# Patient Record
Sex: MALE | Race: WHITE | Employment: OTHER | URBAN - METROPOLITAN AREA
[De-identification: names, ages, dates, MRNs, and addresses within clinical notes are randomized per-mention and may not be internally consistent; named-entity substitution may affect disease eponyms.]

---

## 2017-02-05 ENCOUNTER — APPOINTMENT (OUTPATIENT)
Dept: GENERAL RADIOLOGY | Facility: HOSPITAL | Age: 82
DRG: 247 | End: 2017-02-05
Attending: EMERGENCY MEDICINE
Payer: MEDICARE

## 2017-02-05 ENCOUNTER — HOSPITAL ENCOUNTER (INPATIENT)
Facility: HOSPITAL | Age: 82
LOS: 3 days | Discharge: HOME | DRG: 247 | End: 2017-02-09
Attending: EMERGENCY MEDICINE | Admitting: INTERNAL MEDICINE
Payer: MEDICARE

## 2017-02-05 DIAGNOSIS — N18.9 RENAL FAILURE (ARF), ACUTE ON CHRONIC (HCC): ICD-10-CM

## 2017-02-05 DIAGNOSIS — N17.9 RENAL FAILURE (ARF), ACUTE ON CHRONIC (HCC): ICD-10-CM

## 2017-02-05 DIAGNOSIS — E86.0 DEHYDRATION: ICD-10-CM

## 2017-02-05 DIAGNOSIS — R55 SYNCOPE, NEAR: Primary | ICD-10-CM

## 2017-02-05 PROBLEM — R79.89 AZOTEMIA: Status: ACTIVE | Noted: 2017-02-05

## 2017-02-05 PROBLEM — R73.9 HYPERGLYCEMIA: Status: ACTIVE | Noted: 2017-02-05

## 2017-02-05 PROBLEM — E87.2 METABOLIC ACIDOSIS: Status: ACTIVE | Noted: 2017-02-05

## 2017-02-05 LAB
ALBUMIN SERPL-MCNC: 3.5 G/DL (ref 3.5–4.8)
ALP LIVER SERPL-CCNC: 206 U/L (ref 45–117)
ALT SERPL-CCNC: 16 U/L (ref 17–63)
AST SERPL-CCNC: 14 U/L (ref 15–41)
BASOPHILS # BLD AUTO: 0.06 X10(3) UL (ref 0–0.1)
BASOPHILS NFR BLD AUTO: 0.7 %
BILIRUB SERPL-MCNC: 0.5 MG/DL (ref 0.1–2)
BILIRUB UR QL STRIP.AUTO: NEGATIVE
BUN BLD-MCNC: 46 MG/DL (ref 8–20)
CALCIUM BLD-MCNC: 9 MG/DL (ref 8.3–10.3)
CHLORIDE: 109 MMOL/L (ref 101–111)
CO2: 19 MMOL/L (ref 22–32)
CREAT BLD-MCNC: 2.11 MG/DL (ref 0.7–1.3)
EOSINOPHIL # BLD AUTO: 0.1 X10(3) UL (ref 0–0.3)
EOSINOPHIL NFR BLD AUTO: 1.1 %
ERYTHROCYTE [DISTWIDTH] IN BLOOD BY AUTOMATED COUNT: 13.1 % (ref 11.5–16)
GLUCOSE BLD-MCNC: 227 MG/DL (ref 70–99)
GLUCOSE UR STRIP.AUTO-MCNC: >=500 MG/DL
HCT VFR BLD AUTO: 38.4 % (ref 37–53)
HGB BLD-MCNC: 12.7 G/DL (ref 13–17)
IMMATURE GRANULOCYTE COUNT: 0.03 X10(3) UL (ref 0–1)
IMMATURE GRANULOCYTE RATIO %: 0.3 %
KETONES UR STRIP.AUTO-MCNC: NEGATIVE MG/DL
LYMPHOCYTES # BLD AUTO: 1.19 X10(3) UL (ref 0.9–4)
LYMPHOCYTES NFR BLD AUTO: 13.4 %
M PROTEIN MFR SERPL ELPH: 7 G/DL (ref 6.1–8.3)
MCH RBC QN AUTO: 30 PG (ref 27–33.2)
MCHC RBC AUTO-ENTMCNC: 33.1 G/DL (ref 31–37)
MCV RBC AUTO: 90.8 FL (ref 80–99)
MONOCYTES # BLD AUTO: 0.84 X10(3) UL (ref 0.1–0.6)
MONOCYTES NFR BLD AUTO: 9.4 %
NEUTROPHIL ABS PRELIM: 6.69 X10 (3) UL (ref 1.3–6.7)
NEUTROPHILS # BLD AUTO: 6.69 X10(3) UL (ref 1.3–6.7)
NEUTROPHILS NFR BLD AUTO: 75.1 %
NITRITE UR QL STRIP.AUTO: NEGATIVE
PH UR STRIP.AUTO: 5 [PH] (ref 4.5–8)
PLATELET # BLD AUTO: 260 10(3)UL (ref 150–450)
POTASSIUM SERPL-SCNC: 4.5 MMOL/L (ref 3.6–5.1)
PROT UR STRIP.AUTO-MCNC: NEGATIVE MG/DL
RBC # BLD AUTO: 4.23 X10(6)UL (ref 3.8–5.8)
RED CELL DISTRIBUTION WIDTH-SD: 42.7 FL (ref 35.1–46.3)
SODIUM SERPL-SCNC: 138 MMOL/L (ref 136–144)
SP GR UR STRIP.AUTO: 1.01 (ref 1–1.03)
TROPONIN: 0.23 NG/ML (ref ?–0.05)
TROPONIN: <0.046 NG/ML (ref ?–0.05)
UROBILINOGEN UR STRIP.AUTO-MCNC: <2 MG/DL
WBC # BLD AUTO: 8.9 X10(3) UL (ref 4–13)
WBC #/AREA URNS AUTO: >50 /HPF

## 2017-02-05 PROCEDURE — 84484 ASSAY OF TROPONIN QUANT: CPT | Performed by: HOSPITALIST

## 2017-02-05 PROCEDURE — 93010 ELECTROCARDIOGRAM REPORT: CPT | Performed by: INTERNAL MEDICINE

## 2017-02-05 PROCEDURE — 93010 ELECTROCARDIOGRAM REPORT: CPT

## 2017-02-05 PROCEDURE — 81001 URINALYSIS AUTO W/SCOPE: CPT | Performed by: EMERGENCY MEDICINE

## 2017-02-05 PROCEDURE — 80053 COMPREHEN METABOLIC PANEL: CPT

## 2017-02-05 PROCEDURE — 87186 SC STD MICRODIL/AGAR DIL: CPT | Performed by: EMERGENCY MEDICINE

## 2017-02-05 PROCEDURE — 93005 ELECTROCARDIOGRAM TRACING: CPT

## 2017-02-05 PROCEDURE — 87086 URINE CULTURE/COLONY COUNT: CPT | Performed by: EMERGENCY MEDICINE

## 2017-02-05 PROCEDURE — 85025 COMPLETE CBC W/AUTO DIFF WBC: CPT

## 2017-02-05 PROCEDURE — 84484 ASSAY OF TROPONIN QUANT: CPT | Performed by: EMERGENCY MEDICINE

## 2017-02-05 PROCEDURE — 87088 URINE BACTERIA CULTURE: CPT | Performed by: EMERGENCY MEDICINE

## 2017-02-05 PROCEDURE — 99285 EMERGENCY DEPT VISIT HI MDM: CPT

## 2017-02-05 PROCEDURE — 71010 XR CHEST AP PORTABLE  (CPT=71010): CPT

## 2017-02-05 PROCEDURE — 96360 HYDRATION IV INFUSION INIT: CPT

## 2017-02-05 RX ORDER — LEVOTHYROXINE SODIUM 88 UG/1
88 TABLET ORAL
Status: DISCONTINUED | OUTPATIENT
Start: 2017-02-06 | End: 2017-02-09

## 2017-02-05 RX ORDER — HEPARIN SODIUM 5000 [USP'U]/ML
5000 INJECTION, SOLUTION INTRAVENOUS; SUBCUTANEOUS EVERY 8 HOURS SCHEDULED
Status: DISCONTINUED | OUTPATIENT
Start: 2017-02-05 | End: 2017-02-05

## 2017-02-05 RX ORDER — BISACODYL 10 MG
10 SUPPOSITORY, RECTAL RECTAL
Status: DISCONTINUED | OUTPATIENT
Start: 2017-02-05 | End: 2017-02-09

## 2017-02-05 RX ORDER — LATANOPROST 50 UG/ML
1 SOLUTION/ DROPS OPHTHALMIC NIGHTLY
Status: DISCONTINUED | OUTPATIENT
Start: 2017-02-05 | End: 2017-02-09

## 2017-02-05 RX ORDER — SODIUM CHLORIDE 9 MG/ML
INJECTION, SOLUTION INTRAVENOUS CONTINUOUS
Status: DISCONTINUED | OUTPATIENT
Start: 2017-02-05 | End: 2017-02-07

## 2017-02-05 RX ORDER — CHOLESTYRAMINE LIGHT 4 G/5.7G
4 POWDER, FOR SUSPENSION ORAL DAILY
Status: DISCONTINUED | OUTPATIENT
Start: 2017-02-05 | End: 2017-02-09

## 2017-02-05 RX ORDER — ONDANSETRON 2 MG/ML
4 INJECTION INTRAMUSCULAR; INTRAVENOUS EVERY 6 HOURS PRN
Status: DISCONTINUED | OUTPATIENT
Start: 2017-02-05 | End: 2017-02-09

## 2017-02-05 RX ORDER — ACETAMINOPHEN 325 MG/1
650 TABLET ORAL EVERY 6 HOURS PRN
Status: DISCONTINUED | OUTPATIENT
Start: 2017-02-05 | End: 2017-02-09

## 2017-02-05 RX ORDER — POLYETHYLENE GLYCOL 3350 17 G/17G
17 POWDER, FOR SOLUTION ORAL DAILY PRN
Status: DISCONTINUED | OUTPATIENT
Start: 2017-02-05 | End: 2017-02-09

## 2017-02-05 RX ORDER — SODIUM CHLORIDE 9 MG/ML
1000 INJECTION, SOLUTION INTRAVENOUS CONTINUOUS
Status: ACTIVE | OUTPATIENT
Start: 2017-02-05 | End: 2017-02-05

## 2017-02-05 RX ORDER — HEPARIN SODIUM 5000 [USP'U]/ML
5000 INJECTION, SOLUTION INTRAVENOUS; SUBCUTANEOUS EVERY 8 HOURS
Status: DISCONTINUED | OUTPATIENT
Start: 2017-02-05 | End: 2017-02-09

## 2017-02-05 NOTE — ED INITIAL ASSESSMENT (HPI)
Pt rpt feeling dizzy this morning when getting ready for Religious. Denies NV. Pt c/o some neck discomfort. Pt daughter rpt Pt has hx. Urosepsis & A-Fib.

## 2017-02-05 NOTE — H&P
[facility] Hospitalist H&P       CC: near syncope    PCP: Anu Mcclelland MD    History of Present Illness:     Pt is an [de-identified] with HL, DMII, BPH, who is admitted for near syncope.  Pt states that he went to [employer] this AM and upon arrival home, he was sitti Cholecalciferol (VITAMIN D) 1000 UNITS Oral Cap Take  by mouth.  Disp:  Rfl:          Soc Hx     Smoking status: Former Smoker     Smokeless tobacco: Never Used    Comment: stoped 1940's    Alcohol Use: No        Fam Hx  Family History   Problem Relation Recent Labs   Lab  02/05/17   1325   TROP  <0.046       Additional Diagnostics: ECG: NSR HR 61  CXR: image personally reviewed agree with radiologist read    Radiology: Xr Chest Ap Portable  (cpt=71010)    2/5/2017  PROCEDURE:  XR CHEST AP PORTABLE (

## 2017-02-05 NOTE — ED NOTES
Pt able to go to bathroom and provide urine sample w/o difficulty or incident, sample immediately sent to lab

## 2017-02-05 NOTE — PROGRESS NOTES
Brief Internal Medicine Note    Full Note to Follow      Pt is an [de-identified] with HL, DMII, BPH, who is admitted for near syncope. Pt states that he went to [employer] this AM and upon arrival home, he was sitting in bed and felt lightheaded for approx 30 minutes.

## 2017-02-05 NOTE — ED PROVIDER NOTES
Patient Seen in: [facility] Emergency Department    History   Patient presents with:  Dizziness (neurologic)  Hypotension (cardiovascular)    Stated Complaint: dizzy    HPI    Patient is an 56-year-old male comes into emergency room for evaluation o BY MOUTH ONCE DAILY   Cholestyramine 4 GM/DOSE Oral Powder,  1 LEVEL SCOOPFUL TWICE A DAY IN A GLASS OF WATER OR BEVERAGE OF YOUR CHOICE   latanoprost (XALATAN) 0.005 % Ophthalmic Solution,     OneTouch UltraSoft Lancets Does not apply Misc,  CHECK BLOOD G nondistended, no pulsatile masses. nontender    MUSCULOSKELETAL: No calf tenderness. Dorsalis and Posterior Tibial pulses present. No clubbing. No cyanosis. No edema. SKIN EXAMINATIoN: Warm and dry with normal appearance. No rashes or lesions.   Ole Sjogren lungs are otherwise clear. The cardiomediastinal silhouette and pulmonary vasculature are within normal limits. No significant osseous findings. 2/5/2017  CONCLUSION:  1. Mild bibasilar atelectasis versus scarring. 2. No other significant findings.

## 2017-02-05 NOTE — ED NOTES
Checked on Pt.  Pt rpt feeling improvement w/ lying still in cart, however, still has some feeling of lightheadedness

## 2017-02-06 ENCOUNTER — APPOINTMENT (OUTPATIENT)
Dept: CV DIAGNOSTICS | Facility: HOSPITAL | Age: 82
DRG: 247 | End: 2017-02-06
Attending: HOSPITALIST
Payer: MEDICARE

## 2017-02-06 LAB
ATRIAL RATE: 58 BPM
ATRIAL RATE: 71 BPM
BUN BLD-MCNC: 42 MG/DL (ref 8–20)
CALCIUM BLD-MCNC: 8.2 MG/DL (ref 8.3–10.3)
CHLORIDE: 115 MMOL/L (ref 101–111)
CO2: 24 MMOL/L (ref 22–32)
CREAT BLD-MCNC: 1.67 MG/DL (ref 0.7–1.3)
ERYTHROCYTE [DISTWIDTH] IN BLOOD BY AUTOMATED COUNT: 13.1 % (ref 11.5–16)
GLUCOSE BLD-MCNC: 121 MG/DL (ref 70–99)
GLUCOSE BLD-MCNC: 166 MG/DL (ref 65–99)
GLUCOSE BLD-MCNC: 166 MG/DL (ref 65–99)
GLUCOSE BLD-MCNC: 173 MG/DL (ref 65–99)
HAV IGM SER QL: 1.9 MG/DL (ref 1.7–3)
HCT VFR BLD AUTO: 36.4 % (ref 37–53)
HGB BLD-MCNC: 11.8 G/DL (ref 13–17)
MCH RBC QN AUTO: 29.9 PG (ref 27–33.2)
MCHC RBC AUTO-ENTMCNC: 32.4 G/DL (ref 31–37)
MCV RBC AUTO: 92.2 FL (ref 80–99)
P AXIS: 89 DEGREES
P AXIS: 91 DEGREES
P-R INTERVAL: 202 MS
P-R INTERVAL: 206 MS
PLATELET # BLD AUTO: 236 10(3)UL (ref 150–450)
POTASSIUM SERPL-SCNC: 5.2 MMOL/L (ref 3.6–5.1)
Q-T INTERVAL: 386 MS
Q-T INTERVAL: 420 MS
QRS DURATION: 84 MS
QRS DURATION: 84 MS
QTC CALCULATION (BEZET): 412 MS
QTC CALCULATION (BEZET): 419 MS
R AXIS: 16 DEGREES
R AXIS: 35 DEGREES
RBC # BLD AUTO: 3.95 X10(6)UL (ref 3.8–5.8)
RED CELL DISTRIBUTION WIDTH-SD: 44.8 FL (ref 35.1–46.3)
SODIUM SERPL-SCNC: 145 MMOL/L (ref 136–144)
T AXIS: 30 DEGREES
T AXIS: 67 DEGREES
TROPONIN: 0.24 NG/ML (ref ?–0.05)
VENTRICULAR RATE: 58 BPM
VENTRICULAR RATE: 71 BPM
WBC # BLD AUTO: 8.3 X10(3) UL (ref 4–13)

## 2017-02-06 PROCEDURE — 80048 BASIC METABOLIC PNL TOTAL CA: CPT | Performed by: HOSPITALIST

## 2017-02-06 PROCEDURE — 83735 ASSAY OF MAGNESIUM: CPT | Performed by: HOSPITALIST

## 2017-02-06 PROCEDURE — 97162 PT EVAL MOD COMPLEX 30 MIN: CPT

## 2017-02-06 PROCEDURE — 93005 ELECTROCARDIOGRAM TRACING: CPT

## 2017-02-06 PROCEDURE — 93306 TTE W/DOPPLER COMPLETE: CPT | Performed by: INTERNAL MEDICINE

## 2017-02-06 PROCEDURE — 82962 GLUCOSE BLOOD TEST: CPT

## 2017-02-06 PROCEDURE — 85027 COMPLETE CBC AUTOMATED: CPT | Performed by: HOSPITALIST

## 2017-02-06 PROCEDURE — 93306 TTE W/DOPPLER COMPLETE: CPT

## 2017-02-06 PROCEDURE — 97116 GAIT TRAINING THERAPY: CPT

## 2017-02-06 PROCEDURE — 93010 ELECTROCARDIOGRAM REPORT: CPT | Performed by: INTERNAL MEDICINE

## 2017-02-06 PROCEDURE — 96372 THER/PROPH/DIAG INJ SC/IM: CPT

## 2017-02-06 PROCEDURE — 84484 ASSAY OF TROPONIN QUANT: CPT | Performed by: INTERNAL MEDICINE

## 2017-02-06 RX ORDER — SODIUM POLYSTYRENE SULFONATE 15 G/60ML
15 SUSPENSION ORAL; RECTAL ONCE
Status: COMPLETED | OUTPATIENT
Start: 2017-02-06 | End: 2017-02-06

## 2017-02-06 RX ORDER — METOPROLOL SUCCINATE 25 MG/1
25 TABLET, EXTENDED RELEASE ORAL
Status: DISCONTINUED | OUTPATIENT
Start: 2017-02-06 | End: 2017-02-09

## 2017-02-06 RX ORDER — DILTIAZEM HYDROCHLORIDE 5 MG/ML
10 INJECTION INTRAVENOUS EVERY 2 HOUR PRN
Status: DISCONTINUED | OUTPATIENT
Start: 2017-02-06 | End: 2017-02-09

## 2017-02-06 RX ORDER — ASPIRIN 81 MG/1
81 TABLET ORAL DAILY
Status: DISCONTINUED | OUTPATIENT
Start: 2017-02-06 | End: 2017-02-09

## 2017-02-06 NOTE — PLAN OF CARE
CARDIOVASCULAR - ADULT    • Maintains optimal cardiac output and hemodynamic stability Progressing    • Absence of cardiac arrhythmias or at baseline Progressing          Patient is alert and oriented x4, VS stable, RA, , 2 episodes of Afib with RVR thi

## 2017-02-06 NOTE — CONSULTS
[facility] Electrophysiology Consult      Ania Arevalo is a [de-identified]year old male    [facility] he had and episode of lightheadedness while sitting which lasted 30 minutes. Came to the ER.     Here today this am had a 20 minute episode of atrial flutter with rates to 145bpm  · Echo: 2/2017: EF 55, mod LAE  · Nuc stress: 2/2017: pending  · LHC: 2008: moderate coronary artery disease   · Tsh: pending    Impression:  paroxysmal atrial fibrillation and atrial flutter     Plan:  Discussed options including rate control, ant

## 2017-02-06 NOTE — PROGRESS NOTES
[facility] Hospitalist Progress Note     PCP: Epi Lopez MD    CC:  Follow up    SUBJECTIVE:  This AM, pt walking halls with PT and said he felt LH. Per conversation with RN, pt went from NSR to afib with RVR for a few minutes, then back to NSR.   Pt was succinate  25 mg Oral Daily Beta Blocker   • Levothyroxine Sodium  88 mcg Oral Before breakfast   • latanoprost  1 drop Right Eye Nightly   • cholestyramine light  4 g Oral Daily   • cholecalciferol  1,000 Units Oral Daily   • Heparin Sodium (Porcine)  5,0

## 2017-02-06 NOTE — PLAN OF CARE
During ambulation with PT patient converted from NSR to Afib with RVR. Patient in Afib for few minutes than back to NSR. After ambulation patient back in Afib with RVR and hypotention with symptoms including dizziness.  Patient placed in the bed in supine p

## 2017-02-06 NOTE — PHYSICAL THERAPY NOTE
PHYSICAL THERAPY EVALUATION - INPATIENT     Room Number: 4878/7393-R  Evaluation Date: 2/6/2017  Type of Evaluation: Initial  Physician Order: PT Eval and Treat    Presenting Problem: syncope  Reason for Therapy: Mobility Dysfunction and Discharge Plan PAIN ASSESSMENT  Ratin  Location: upper traps, cerv  Management Techniques:  Activity promotion    COGNITION  · Overall Cognitive Status:  WFL - within functional limits  · Orientation Level:  oriented x4  · Following Commands:  follows multistep co AM-PAC Score:  Raw Score: 21   PT Approx Degree of Impairment Score: 28.97%   Standardized Score (AM-PAC Scale): 50.25   CMS Modifier (G-Code): CJ    FUNCTIONAL ABILITY STATUS  Gait Assessment   Gait Assistance: Supervision  Distance (ft): 150  Assisti to address the above deficits to assist patient in returning to prior level of function. DISCHARGE RECOMMENDATIONS  PT Discharge Recommendations: Home;Outpatient PT    PLAN  PT Treatment Plan: Balance training;Gait training;Patient education; Energy cons

## 2017-02-06 NOTE — CONSULTS
[facility] Cardiology  Report of Consultation    Mere Sidhu Patient Status:  Observation    1935 MRN [id number]   [facility] 3NE-A Attending Noe Costello, *   Hosp Day # 1 PCP Jacinda Clarke MD     Reason fo Levothyroxine Sodium  88 mcg Oral Before breakfast   • latanoprost  1 drop Right Eye Nightly   • cholestyramine light  4 g Oral Daily   • cholecalciferol  1,000 Units Oral Daily   • Heparin Sodium (Porcine)  5,000 Units Subcutaneous Q8H       Continuous In 02/06/17  0854   BP: 143/62   Pulse: 63   Temp: 98.3 °F (36.8 °C)   Resp: 16     Wt Readings from Last 3 Encounters:  02/05/17 : 196 lb (88.905 kg)  09/19/16 : 196 lb (88.905 kg)  05/05/16 : 198 lb (89.812 kg)          General: Well developed, well nourish TROP  <0.046  0.233*  0.237*       Diagnostics:   Tele:  SR. PSVT noted. EKG: SB  Echo:  Pending    Assessment:  1. Near syncope. Suspect multifactorial:   -Arrhythmia. Episodes of PSVT noted and reproduce Sx   -Dehydration   -Medication effect  2.

## 2017-02-07 ENCOUNTER — APPOINTMENT (OUTPATIENT)
Dept: CV DIAGNOSTICS | Facility: HOSPITAL | Age: 82
DRG: 247 | End: 2017-02-07
Attending: INTERNAL MEDICINE
Payer: MEDICARE

## 2017-02-07 LAB
ATRIAL RATE: 61 BPM
FREE T4: 1.2 NG/DL (ref 0.9–1.8)
GLUCOSE BLD-MCNC: 125 MG/DL (ref 65–99)
GLUCOSE BLD-MCNC: 140 MG/DL (ref 65–99)
GLUCOSE BLD-MCNC: 192 MG/DL (ref 65–99)
GLUCOSE BLD-MCNC: 224 MG/DL (ref 65–99)
P AXIS: -11 DEGREES
P-R INTERVAL: 206 MS
Q-T INTERVAL: 408 MS
QRS DURATION: 82 MS
QTC CALCULATION (BEZET): 410 MS
R AXIS: 10 DEGREES
T AXIS: 33 DEGREES
TSI SER-ACNC: 3.74 MIU/ML (ref 0.35–5.5)
VENTRICULAR RATE: 61 BPM

## 2017-02-07 PROCEDURE — 78452 HT MUSCLE IMAGE SPECT MULT: CPT

## 2017-02-07 PROCEDURE — 93017 CV STRESS TEST TRACING ONLY: CPT

## 2017-02-07 PROCEDURE — 84443 ASSAY THYROID STIM HORMONE: CPT | Performed by: INTERNAL MEDICINE

## 2017-02-07 PROCEDURE — 93018 CV STRESS TEST I&R ONLY: CPT | Performed by: INTERNAL MEDICINE

## 2017-02-07 PROCEDURE — 93005 ELECTROCARDIOGRAM TRACING: CPT

## 2017-02-07 PROCEDURE — 82962 GLUCOSE BLOOD TEST: CPT

## 2017-02-07 PROCEDURE — 84439 ASSAY OF FREE THYROXINE: CPT | Performed by: INTERNAL MEDICINE

## 2017-02-07 PROCEDURE — 93010 ELECTROCARDIOGRAM REPORT: CPT | Performed by: INTERNAL MEDICINE

## 2017-02-07 RX ORDER — SODIUM CHLORIDE 9 MG/ML
INJECTION, SOLUTION INTRAVENOUS CONTINUOUS
Status: DISCONTINUED | OUTPATIENT
Start: 2017-02-07 | End: 2017-02-09

## 2017-02-07 RX ORDER — ASPIRIN 81 MG/1
324 TABLET, CHEWABLE ORAL ONCE
Status: COMPLETED | OUTPATIENT
Start: 2017-02-08 | End: 2017-02-08

## 2017-02-07 NOTE — PROGRESS NOTES
[facility] Hospitalist Progress Note     PCP: Eloisa Donovan MD    CC:  Follow up    SUBJECTIVE:  Patient seen after stress today, states he feels well.  No CP/SOB    OBJECTIVE:  Temp:  [98.1 °F (36.7 °C)-98.9 °F (37.2 °C)] 98.6 °F (37 °C)  Pulse:  [] 5 Right Eye Nightly   • cholestyramine light  4 g Oral Daily   • cholecalciferol  1,000 Units Oral Daily   • Heparin Sodium (Porcine)  5,000 Units Subcutaneous Q8H     • sodium chloride 75 mL/hr at 02/06/17 2205     DiltiaZEM HCl, acetaminophen, PEG 3350, ma

## 2017-02-07 NOTE — PLAN OF CARE
PT A/O, DENIES DIZZINESS, 98% ON RA, SR/SB ON TELE, HR 50-60'S, LE EDEMA, SCDS ON, IVF INFUSING, LABS LEXISCAN TODAY, INSTRUCTED PT ON POC.   CARDIOVASCULAR - ADULT    • Maintains optimal cardiac output and hemodynamic stability Progressing    • Absence of

## 2017-02-07 NOTE — PLAN OF CARE
A/Ox4. Calm and cooperative. On RA with  97%. Tele- SB 54. Waiting for Everlena Rides to be completed. Accucheck ACHS. Family at bedside. IVF infusing. Denies any CP, SOB, or dizziness. Staff will cont to monitor.     CARDIOVASCULAR - ADULT    • Maintains opt

## 2017-02-07 NOTE — PROGRESS NOTES
[facility] [facility] Cardiology  Progress Note    Lovetta Fast Patient Status:  Inpatient    1935 MRN [id number]   [facility] 3NE-A Attending Uriel Avalos MD   Hosp Day # 2 PCP Gabby Rodriguez MD     Subjective:   No chest pain Cardiovascular:  Cardiovascular examination demonstrates a regular bradycardic  rate and rhythm. There is normal S1, S2. There is no S3 or S4. There are no murmurs, rubs, or gallops. No click is appreciated.   PMI is nondisplaced with a normal apical Right ventricle: The cavity size was normal. Systolic function was     normal.  3. Aorta: The aorta was dilated within the upper limits of normal. Aortic     root dimension: 4.0cm (ED, M-mode).   4. Pulmonary arteries: Systolic pressure could not be accurat

## 2017-02-07 NOTE — PROGRESS NOTES
Patient completed the Lexiscan Nuc Stress test without ekg changes or cardiac symptoms. Nuc scan pending.

## 2017-02-07 NOTE — CERTIFICATION
**Certification    PHYSICIAN Certification of Need for Inpatient Hospitalization    Based on the his current state of illness, Mike Mccrary requires inpatient hospitalization for his syncope.   This requires inpatient medical treatment because the patient

## 2017-02-08 ENCOUNTER — APPOINTMENT (OUTPATIENT)
Dept: INTERVENTIONAL RADIOLOGY/VASCULAR | Facility: HOSPITAL | Age: 82
DRG: 247 | End: 2017-02-08
Attending: INTERNAL MEDICINE
Payer: MEDICARE

## 2017-02-08 LAB
APTT PPP: 32.4 SECONDS (ref 25–34)
BASOPHILS # BLD AUTO: 0.07 X10(3) UL (ref 0–0.1)
BASOPHILS NFR BLD AUTO: 1.1 %
BUN BLD-MCNC: 31 MG/DL (ref 8–20)
CALCIUM BLD-MCNC: 8.3 MG/DL (ref 8.3–10.3)
CHLORIDE: 113 MMOL/L (ref 101–111)
CO2: 22 MMOL/L (ref 22–32)
CREAT BLD-MCNC: 1.63 MG/DL (ref 0.7–1.3)
EOSINOPHIL # BLD AUTO: 0.33 X10(3) UL (ref 0–0.3)
EOSINOPHIL NFR BLD AUTO: 5.3 %
ERYTHROCYTE [DISTWIDTH] IN BLOOD BY AUTOMATED COUNT: 12.9 % (ref 11.5–16)
GLUCOSE BLD-MCNC: 145 MG/DL (ref 65–99)
GLUCOSE BLD-MCNC: 145 MG/DL (ref 70–99)
GLUCOSE BLD-MCNC: 146 MG/DL (ref 65–99)
GLUCOSE BLD-MCNC: 152 MG/DL (ref 65–99)
GLUCOSE BLD-MCNC: 256 MG/DL (ref 65–99)
GLUCOSE BLD-MCNC: 331 MG/DL (ref 65–99)
HCT VFR BLD AUTO: 36.1 % (ref 37–53)
HGB BLD-MCNC: 11.7 G/DL (ref 13–17)
IMMATURE GRANULOCYTE COUNT: 0.02 X10(3) UL (ref 0–1)
IMMATURE GRANULOCYTE RATIO %: 0.3 %
INR BLD: 1.02 (ref 0.89–1.12)
LYMPHOCYTES # BLD AUTO: 1.93 X10(3) UL (ref 0.9–4)
LYMPHOCYTES NFR BLD AUTO: 30.7 %
MCH RBC QN AUTO: 29.8 PG (ref 27–33.2)
MCHC RBC AUTO-ENTMCNC: 32.4 G/DL (ref 31–37)
MCV RBC AUTO: 92.1 FL (ref 80–99)
MONOCYTES # BLD AUTO: 0.64 X10(3) UL (ref 0.1–0.6)
MONOCYTES NFR BLD AUTO: 10.2 %
NEUTROPHIL ABS PRELIM: 3.29 X10 (3) UL (ref 1.3–6.7)
NEUTROPHILS # BLD AUTO: 3.29 X10(3) UL (ref 1.3–6.7)
NEUTROPHILS NFR BLD AUTO: 52.4 %
PLATELET # BLD AUTO: 232 10(3)UL (ref 150–450)
POTASSIUM SERPL-SCNC: 4.6 MMOL/L (ref 3.6–5.1)
PSA SERPL DL<=0.01 NG/ML-MCNC: 13.7 SECONDS (ref 12.3–14.8)
RBC # BLD AUTO: 3.92 X10(6)UL (ref 3.8–5.8)
RED CELL DISTRIBUTION WIDTH-SD: 43.4 FL (ref 35.1–46.3)
SODIUM SERPL-SCNC: 142 MMOL/L (ref 136–144)
WBC # BLD AUTO: 6.3 X10(3) UL (ref 4–13)

## 2017-02-08 PROCEDURE — 85610 PROTHROMBIN TIME: CPT | Performed by: INTERNAL MEDICINE

## 2017-02-08 PROCEDURE — 82962 GLUCOSE BLOOD TEST: CPT

## 2017-02-08 PROCEDURE — B215YZZ FLUOROSCOPY OF LEFT HEART USING OTHER CONTRAST: ICD-10-PCS | Performed by: INTERNAL MEDICINE

## 2017-02-08 PROCEDURE — 027035Z DILATION OF CORONARY ARTERY, ONE ARTERY WITH TWO DRUG-ELUTING INTRALUMINAL DEVICES, PERCUTANEOUS APPROACH: ICD-10-PCS | Performed by: INTERNAL MEDICINE

## 2017-02-08 PROCEDURE — 93454 CORONARY ARTERY ANGIO S&I: CPT

## 2017-02-08 PROCEDURE — 4A023N7 MEASUREMENT OF CARDIAC SAMPLING AND PRESSURE, LEFT HEART, PERCUTANEOUS APPROACH: ICD-10-PCS | Performed by: INTERNAL MEDICINE

## 2017-02-08 PROCEDURE — 85730 THROMBOPLASTIN TIME PARTIAL: CPT | Performed by: INTERNAL MEDICINE

## 2017-02-08 PROCEDURE — 93010 ELECTROCARDIOGRAM REPORT: CPT | Performed by: INTERNAL MEDICINE

## 2017-02-08 PROCEDURE — 93005 ELECTROCARDIOGRAM TRACING: CPT

## 2017-02-08 PROCEDURE — 85025 COMPLETE CBC W/AUTO DIFF WBC: CPT | Performed by: INTERNAL MEDICINE

## 2017-02-08 PROCEDURE — 83036 HEMOGLOBIN GLYCOSYLATED A1C: CPT | Performed by: HOSPITALIST

## 2017-02-08 PROCEDURE — 99152 MOD SED SAME PHYS/QHP 5/>YRS: CPT

## 2017-02-08 PROCEDURE — 80048 BASIC METABOLIC PNL TOTAL CA: CPT | Performed by: INTERNAL MEDICINE

## 2017-02-08 PROCEDURE — B211YZZ FLUOROSCOPY OF MULTIPLE CORONARY ARTERIES USING OTHER CONTRAST: ICD-10-PCS | Performed by: INTERNAL MEDICINE

## 2017-02-08 PROCEDURE — 99153 MOD SED SAME PHYS/QHP EA: CPT

## 2017-02-08 RX ORDER — NITROGLYCERIN 0.4 MG/1
TABLET SUBLINGUAL
Status: DISPENSED
Start: 2017-02-08 | End: 2017-02-09

## 2017-02-08 RX ORDER — MAGNESIUM HYDROXIDE/ALUMINUM HYDROXICE/SIMETHICONE 120; 1200; 1200 MG/30ML; MG/30ML; MG/30ML
30 SUSPENSION ORAL 4 TIMES DAILY PRN
Status: DISCONTINUED | OUTPATIENT
Start: 2017-02-08 | End: 2017-02-09

## 2017-02-08 RX ORDER — SODIUM CHLORIDE 9 MG/ML
INJECTION, SOLUTION INTRAVENOUS CONTINUOUS
Status: ACTIVE | OUTPATIENT
Start: 2017-02-08 | End: 2017-02-09

## 2017-02-08 RX ORDER — CLOPIDOGREL BISULFATE 75 MG/1
75 TABLET ORAL DAILY
Status: DISCONTINUED | OUTPATIENT
Start: 2017-02-09 | End: 2017-02-09

## 2017-02-08 RX ORDER — HYDRALAZINE HYDROCHLORIDE 20 MG/ML
INJECTION INTRAMUSCULAR; INTRAVENOUS
Status: COMPLETED
Start: 2017-02-08 | End: 2017-02-08

## 2017-02-08 RX ORDER — CLOPIDOGREL BISULFATE 75 MG/1
TABLET ORAL
Status: COMPLETED
Start: 2017-02-08 | End: 2017-02-08

## 2017-02-08 RX ORDER — HEPARIN SODIUM 5000 [USP'U]/ML
INJECTION, SOLUTION INTRAVENOUS; SUBCUTANEOUS
Status: COMPLETED
Start: 2017-02-08 | End: 2017-02-08

## 2017-02-08 RX ORDER — NITROGLYCERIN 0.4 MG/1
0.4 TABLET SUBLINGUAL ONCE
Status: COMPLETED | OUTPATIENT
Start: 2017-02-08 | End: 2017-02-08

## 2017-02-08 RX ORDER — LIDOCAINE HYDROCHLORIDE 10 MG/ML
INJECTION, SOLUTION INFILTRATION; PERINEURAL
Status: COMPLETED
Start: 2017-02-08 | End: 2017-02-08

## 2017-02-08 RX ORDER — MIDAZOLAM HYDROCHLORIDE 1 MG/ML
INJECTION INTRAMUSCULAR; INTRAVENOUS
Status: COMPLETED
Start: 2017-02-08 | End: 2017-02-08

## 2017-02-08 RX ORDER — DEXTROSE MONOHYDRATE 25 G/50ML
50 INJECTION, SOLUTION INTRAVENOUS
Status: DISCONTINUED | OUTPATIENT
Start: 2017-02-08 | End: 2017-02-09

## 2017-02-08 RX ORDER — NOREPINEPHRINE BITARTRATE 1 MG/ML
INJECTION, SOLUTION INTRAVENOUS
Status: DISCONTINUED
Start: 2017-02-08 | End: 2017-02-08 | Stop reason: WASHOUT

## 2017-02-08 NOTE — PROGRESS NOTES
[facility] Cardiology  Progress Note    Duane Mitchell Patient Status:  Inpatient    1935 MRN [id number]   [facility] 3NE-A Attending Mireille Suggs MD   Hosp Day # 3 PCP Johnnie Goodwin MD     Subjective:   Feels ok.   Pa Cardiovascular examination demonstrates a regular bradycardic  rate and rhythm. There is normal S1, S2. There is no S3 or S4. There are no murmurs, rubs, or gallops. No click is appreciated. PMI is nondisplaced with a normal apical impulse.     Pulmona evidence for regional wall motion abnormalities.     Doppler parameters are consistent with abnormal left ventricular     relaxation - grade 1 diastolic dysfunction. 2. Right ventricle:  The cavity size was normal. Systolic function was     normal.  3. Aor

## 2017-02-08 NOTE — CM/SW NOTE
02/08/17 1000   CM/SW Referral Data   Referral Source Social Work (self-referral)   Reason for Referral Discharge planning   Informant Patient   Pertinent Medical Hx   Primary Care Physician Name Corina Espinosa   Patient Info   Patient's Mental Status Alert;Alison

## 2017-02-08 NOTE — PLAN OF CARE
Complaint of chest pain and chest tightness noted, ECG ordered, will notify cardiology for further orders. Patient remains closely monitored.

## 2017-02-08 NOTE — PROGRESS NOTES
[facility] Hospitalist Progress Note     PCP: Jim Youngblood MD    CC:  Follow up    SUBJECTIVE:  Patient seen this AM, states he feels well. No CP/SOB.  Plan for cath today    OBJECTIVE:  Temp:  [97.6 °F (36.4 °C)-99.1 °F (37.3 °C)] 97.7 °F (36.5 °C)  Pulse: aspirin  324 mg Oral Once   • aspirin EC  81 mg Oral Daily   • metoprolol succinate  25 mg Oral Daily Beta Blocker   • Levothyroxine Sodium  88 mcg Oral Before breakfast   • latanoprost  1 drop Right Eye Nightly   • cholestyramine light  4 g Oral Daily   •

## 2017-02-08 NOTE — PROGRESS NOTES
Paged Dr. Riley Tariq. Orders already to hold coumadin and lovenox for angiogram in am.  Heparin ordered, just need authorization to hold as well. 2010    Paging Dr. Nadeen Hussein in regards to HR. Metoprolol?   Spoke to Dr. Redmond Fails and is aware of HR and order to

## 2017-02-08 NOTE — PROGRESS NOTES
Patient presented semi-supine in bed; VSS and agreeable to participate. Transferred supine>sit and sit>stand w/ supervision. Ambulated 300' w/SBA sans AD. Upon completion, patient made comfortable in [facility] with call light in reach.   RN Orquidea Polo aware of ses

## 2017-02-09 VITALS
WEIGHT: 196 LBS | OXYGEN SATURATION: 96 % | RESPIRATION RATE: 16 BRPM | BODY MASS INDEX: 25.15 KG/M2 | HEIGHT: 74 IN | SYSTOLIC BLOOD PRESSURE: 128 MMHG | TEMPERATURE: 98 F | HEART RATE: 55 BPM | DIASTOLIC BLOOD PRESSURE: 60 MMHG

## 2017-02-09 LAB
ATRIAL RATE: 51 BPM
ATRIAL RATE: 54 BPM
ATRIAL RATE: 55 BPM
BASOPHILS # BLD AUTO: 0.04 X10(3) UL (ref 0–0.1)
BASOPHILS NFR BLD AUTO: 0.5 %
BUN BLD-MCNC: 29 MG/DL (ref 8–20)
CALCIUM BLD-MCNC: 8.4 MG/DL (ref 8.3–10.3)
CHLORIDE: 113 MMOL/L (ref 101–111)
CO2: 20 MMOL/L (ref 22–32)
CREAT BLD-MCNC: 1.61 MG/DL (ref 0.7–1.3)
EOSINOPHIL # BLD AUTO: 0.24 X10(3) UL (ref 0–0.3)
EOSINOPHIL NFR BLD AUTO: 3.1 %
ERYTHROCYTE [DISTWIDTH] IN BLOOD BY AUTOMATED COUNT: 13.2 % (ref 11.5–16)
EST. AVERAGE GLUCOSE BLD GHB EST-MCNC: 194 MG/DL (ref 68–126)
GLUCOSE BLD-MCNC: 111 MG/DL (ref 65–99)
GLUCOSE BLD-MCNC: 116 MG/DL (ref 70–99)
GLUCOSE BLD-MCNC: 274 MG/DL (ref 65–99)
HBA1C MFR BLD HPLC: 8.4 % (ref ?–5.7)
HCT VFR BLD AUTO: 34.9 % (ref 37–53)
HGB BLD-MCNC: 11.5 G/DL (ref 13–17)
IMMATURE GRANULOCYTE COUNT: 0.02 X10(3) UL (ref 0–1)
IMMATURE GRANULOCYTE RATIO %: 0.3 %
LYMPHOCYTES # BLD AUTO: 1.92 X10(3) UL (ref 0.9–4)
LYMPHOCYTES NFR BLD AUTO: 24.8 %
MCH RBC QN AUTO: 30 PG (ref 27–33.2)
MCHC RBC AUTO-ENTMCNC: 33 G/DL (ref 31–37)
MCV RBC AUTO: 91.1 FL (ref 80–99)
MONOCYTES # BLD AUTO: 0.87 X10(3) UL (ref 0.1–0.6)
MONOCYTES NFR BLD AUTO: 11.3 %
NEUTROPHIL ABS PRELIM: 4.64 X10 (3) UL (ref 1.3–6.7)
NEUTROPHILS # BLD AUTO: 4.64 X10(3) UL (ref 1.3–6.7)
NEUTROPHILS NFR BLD AUTO: 60 %
P AXIS: -20 DEGREES
P AXIS: 3 DEGREES
P AXIS: 58 DEGREES
P-R INTERVAL: 172 MS
P-R INTERVAL: 188 MS
P-R INTERVAL: 196 MS
PLATELET # BLD AUTO: 223 10(3)UL (ref 150–450)
POTASSIUM SERPL-SCNC: 3.8 MMOL/L (ref 3.6–5.1)
Q-T INTERVAL: 436 MS
Q-T INTERVAL: 440 MS
Q-T INTERVAL: 488 MS
QRS DURATION: 80 MS
QRS DURATION: 86 MS
QRS DURATION: 86 MS
QTC CALCULATION (BEZET): 413 MS
QTC CALCULATION (BEZET): 420 MS
QTC CALCULATION (BEZET): 449 MS
R AXIS: 22 DEGREES
R AXIS: 23 DEGREES
R AXIS: 44 DEGREES
RBC # BLD AUTO: 3.83 X10(6)UL (ref 3.8–5.8)
RED CELL DISTRIBUTION WIDTH-SD: 43.4 FL (ref 35.1–46.3)
SODIUM SERPL-SCNC: 144 MMOL/L (ref 136–144)
T AXIS: 47 DEGREES
T AXIS: 59 DEGREES
T AXIS: 66 DEGREES
VENTRICULAR RATE: 51 BPM
VENTRICULAR RATE: 54 BPM
VENTRICULAR RATE: 55 BPM
WBC # BLD AUTO: 7.7 X10(3) UL (ref 4–13)

## 2017-02-09 PROCEDURE — 93010 ELECTROCARDIOGRAM REPORT: CPT | Performed by: INTERNAL MEDICINE

## 2017-02-09 PROCEDURE — 85025 COMPLETE CBC W/AUTO DIFF WBC: CPT | Performed by: INTERNAL MEDICINE

## 2017-02-09 PROCEDURE — 93005 ELECTROCARDIOGRAM TRACING: CPT

## 2017-02-09 PROCEDURE — 82962 GLUCOSE BLOOD TEST: CPT

## 2017-02-09 PROCEDURE — 80048 BASIC METABOLIC PNL TOTAL CA: CPT | Performed by: INTERNAL MEDICINE

## 2017-02-09 RX ORDER — METOPROLOL SUCCINATE 25 MG/1
25 TABLET, EXTENDED RELEASE ORAL
Qty: 30 TABLET | Refills: 6 | Status: SHIPPED | OUTPATIENT
Start: 2017-02-09 | End: 2017-07-26

## 2017-02-09 RX ORDER — CLOPIDOGREL BISULFATE 75 MG/1
75 TABLET ORAL DAILY
Qty: 90 TABLET | Refills: 3 | Status: SHIPPED | OUTPATIENT
Start: 2017-02-09 | End: 2018-01-24

## 2017-02-09 RX ORDER — ASPIRIN 81 MG/1
81 TABLET ORAL DAILY
Qty: 90 TABLET | Refills: 3 | Status: SHIPPED | COMMUNITY
Start: 2017-02-09

## 2017-02-09 NOTE — PLAN OF CARE
NURSING DISCHARGE NOTE    Discharged Home via Wheelchair. Accompanied by Family member and Support staff  Belongings Taken by patient/family . Pt discharged home. IV removed.  Pt given discharge paperwork and understands importance of f/u and medica

## 2017-02-09 NOTE — PROGRESS NOTES
[facility] Cardiology  Progress Note    Mirian Mohr Patient Status:  Inpatient    1935 MRN [id number]   [facility] 3NE-A Attending Osvaldo Prasad MD   Hosp Day # 4 PCP Epi Lopez MD     Subjective:   No chest pain : 198 lb (89.812 kg)      Allergies: Ace Inhibitors            Cardura [Cardura]       Dizziness  Flomax [Tamsulosin]     Dizziness  Statins                     Physical Exam:   General:  Well-developed / Well-nourished. No acute distress.   HEENT:  Nor There was no diagnostic evidence for regional wall motion abnormalities.     Doppler parameters are consistent with abnormal left ventricular     relaxation - grade 1 diastolic dysfunction. 2. Right ventricle:  The cavity size was normal. Systolic function

## 2017-02-09 NOTE — PROCEDURES
[facility] Cardiology      Procedure:  , CORONARY ANGIO, CARMELINA MID LAD, ANGIOMAX, PERCLOSE,                           RFA      Findings    %    LM: NORMAL    LCx: 75% PROX OM, SMALL    LAD:  85-90% MID    RCA: PLAQUE    RESULTS: PTCA/STENT MID LAD, 85% TO 0% WITH

## 2017-02-09 NOTE — DIETARY NOTE
Nutrition Short Note    Pt seen d/t HgbA1c of 8.4% . Pt provided with Diabetic Education including carb counting, label reading, and consistent meals intake. Handout provided. All questions answered. RD to follow PRN.       Alexandru Mazariegos MS, RD, LDN  Pager

## 2017-02-09 NOTE — CARDIAC REHAB
Stent education completed with patient. Stent card given to patient. Patient will call for his cardiac rehab appointment.

## 2017-02-09 NOTE — PROCEDURES
[facility]    PATIENT'S NAME: Liya Phillips   ATTENDING PHYSICIAN: Antoine Benson MD   OPERATING PHYSICIAN: Mora Mac M.D.    PATIENT ACCOUNT#:   [de-identified]    LOCATION:  [address] [place] [address]  MEDICAL RECORD #:   [id number]       DATE OF BI rise to a small to medium obtuse marginal artery and several small posterolateral branches. The obtuse marginal branch had several areas of narrowing of to 75% to 80% in its proximal one-half.   The continuation branch of the circumflex artery which is als postprocedural angiography demonstrated that the mid LAD stenosis was improved from 85% to 0% with good runoff and no dissection. The second diagonal artery which was placed in stent correction out of necessity was left with 90% ostial stenosis but DEYANIRA 3 flow.

## 2017-02-09 NOTE — PROGRESS NOTES
Paging dr. Garza Fly to see if 2200 Heparin should be given since dose given intraoperatively. 2102    Paging Dr. Hedy Echeverria due to pt. BS of 331.   No ISS based on Dr. Bert Locke note on 2-8-17

## 2017-02-10 NOTE — CM/SW NOTE
Pt d/c 02/09 home declining needs.        02/10/17 0900   Discharge disposition   Discharged to: Home or [address] [place] [address] services after discharge Patient refused services   Discharge transportation Private car

## 2017-02-11 NOTE — DISCHARGE SUMMARY
General Medicine Discharge Summary     Patient ID:  Ad De La Torre  [de-identified]year old  12/18/1935    Admit date: 2/5/2017    Discharge date and time: 2/9/2017  4:53 PM     Attending Physician: Manjinder Baumann MD serial trops +, and EKG, tele, echo WNL. Cards consulted, appreciate  -per chart review, pt has had 1x history of transient afib and had seen cards in the past who recommended asa and f/u prn. Monitor on tele  -hold amlodipine for now. Per cards, asa, BB. Disp-90 tablet, R-4    glimepiride 4 MG Oral Tab  Take 1 tablet (4 mg total) by mouth 2 (two) times daily. , Normal, Disp-180 tablet, R-3    Levothyroxine Sodium 88 MCG Oral Tab  TAKE ONE TABLET BY MOUTH ONCE DAILY, Normal, Disp-90 tablet, R-3    Cholestyra

## 2017-02-23 PROBLEM — R00.1 SINUS BRADYCARDIA: Status: ACTIVE | Noted: 2017-02-23

## 2017-02-23 PROBLEM — I48.0 PAROXYSMAL ATRIAL FIBRILLATION (HCC): Status: ACTIVE | Noted: 2017-02-23

## 2017-02-23 PROBLEM — E78.00 PURE HYPERCHOLESTEROLEMIA: Status: ACTIVE | Noted: 2017-02-23

## 2017-02-23 PROBLEM — I25.10 ATHEROSCLEROSIS OF NATIVE CORONARY ARTERY OF NATIVE HEART, ANGINA PRESENCE UNSPECIFIED: Status: ACTIVE | Noted: 2017-02-23

## 2017-02-23 PROBLEM — Z95.820 S/P ANGIOPLASTY WITH STENT: Status: ACTIVE | Noted: 2017-02-23

## 2017-02-23 PROBLEM — I10 ESSENTIAL HYPERTENSION: Status: ACTIVE | Noted: 2017-02-23

## 2017-05-09 PROBLEM — Z79.4 CONTROLLED TYPE 2 DIABETES MELLITUS WITHOUT COMPLICATION, WITH LONG-TERM CURRENT USE OF INSULIN (HCC): Status: ACTIVE | Noted: 2017-05-09

## 2017-05-09 PROBLEM — E87.5 HYPERKALEMIA: Status: ACTIVE | Noted: 2017-05-09

## 2017-05-09 PROBLEM — E11.9 CONTROLLED TYPE 2 DIABETES MELLITUS WITHOUT COMPLICATION, WITH LONG-TERM CURRENT USE OF INSULIN (HCC): Status: ACTIVE | Noted: 2017-05-09

## 2017-08-22 PROBLEM — N40.1 BENIGN PROSTATIC HYPERPLASIA WITH LOWER URINARY TRACT SYMPTOMS, SYMPTOM DETAILS UNSPECIFIED: Status: ACTIVE | Noted: 2017-08-22

## 2017-10-29 ENCOUNTER — HOSPITAL ENCOUNTER (INPATIENT)
Facility: HOSPITAL | Age: 82
LOS: 2 days | Discharge: HOME HEALTH | DRG: 638 | End: 2017-11-01
Attending: INTERNAL MEDICINE | Admitting: INTERNAL MEDICINE
Payer: MEDICARE

## 2017-10-29 DIAGNOSIS — E11.21 UNCONTROLLED TYPE 2 DIABETES MELLITUS WITH DIABETIC NEPHROPATHY, WITH LONG-TERM CURRENT USE OF INSULIN (HCC): ICD-10-CM

## 2017-10-29 DIAGNOSIS — E11.65 UNCONTROLLED TYPE 2 DIABETES MELLITUS WITH DIABETIC NEPHROPATHY, WITH LONG-TERM CURRENT USE OF INSULIN (HCC): ICD-10-CM

## 2017-10-29 DIAGNOSIS — Z79.4 UNCONTROLLED TYPE 2 DIABETES MELLITUS WITH DIABETIC NEPHROPATHY, WITH LONG-TERM CURRENT USE OF INSULIN (HCC): ICD-10-CM

## 2017-10-29 PROBLEM — L03.90 CELLULITIS: Status: ACTIVE | Noted: 2017-10-29

## 2017-10-29 PROCEDURE — 87205 SMEAR GRAM STAIN: CPT | Performed by: EMERGENCY MEDICINE

## 2017-10-29 PROCEDURE — 87077 CULTURE AEROBIC IDENTIFY: CPT | Performed by: EMERGENCY MEDICINE

## 2017-10-29 PROCEDURE — 82962 GLUCOSE BLOOD TEST: CPT

## 2017-10-29 PROCEDURE — 87070 CULTURE OTHR SPECIMN AEROBIC: CPT | Performed by: EMERGENCY MEDICINE

## 2017-10-29 RX ORDER — METOPROLOL SUCCINATE 25 MG/1
25 TABLET, EXTENDED RELEASE ORAL
Status: DISCONTINUED | OUTPATIENT
Start: 2017-10-30 | End: 2017-11-01

## 2017-10-29 RX ORDER — HEPARIN SODIUM 5000 [USP'U]/ML
5000 INJECTION, SOLUTION INTRAVENOUS; SUBCUTANEOUS EVERY 8 HOURS SCHEDULED
Status: DISCONTINUED | OUTPATIENT
Start: 2017-10-29 | End: 2017-11-01

## 2017-10-29 RX ORDER — CLOPIDOGREL BISULFATE 75 MG/1
75 TABLET ORAL DAILY
Status: DISCONTINUED | OUTPATIENT
Start: 2017-10-30 | End: 2017-11-01

## 2017-10-29 RX ORDER — CHOLESTYRAMINE LIGHT 4 G/5.7G
4 POWDER, FOR SUSPENSION ORAL 2 TIMES DAILY
Status: DISCONTINUED | OUTPATIENT
Start: 2017-10-29 | End: 2017-11-01

## 2017-10-29 RX ORDER — ASPIRIN 81 MG/1
81 TABLET ORAL DAILY
Status: DISCONTINUED | OUTPATIENT
Start: 2017-10-30 | End: 2017-11-01

## 2017-10-29 RX ORDER — LEVOTHYROXINE SODIUM 88 UG/1
88 TABLET ORAL
Status: DISCONTINUED | OUTPATIENT
Start: 2017-10-29 | End: 2017-11-01

## 2017-10-29 RX ORDER — LATANOPROST 50 UG/ML
1 SOLUTION/ DROPS OPHTHALMIC NIGHTLY
Status: DISCONTINUED | OUTPATIENT
Start: 2017-10-29 | End: 2017-11-01

## 2017-10-29 RX ORDER — DEXTROSE MONOHYDRATE 25 G/50ML
50 INJECTION, SOLUTION INTRAVENOUS
Status: DISCONTINUED | OUTPATIENT
Start: 2017-10-29 | End: 2017-11-01

## 2017-10-29 RX ORDER — HYDRALAZINE HYDROCHLORIDE 25 MG/1
25 TABLET, FILM COATED ORAL EVERY 8 HOURS PRN
Status: DISCONTINUED | OUTPATIENT
Start: 2017-10-29 | End: 2017-10-30

## 2017-10-30 ENCOUNTER — APPOINTMENT (OUTPATIENT)
Dept: MRI IMAGING | Facility: HOSPITAL | Age: 82
DRG: 638 | End: 2017-10-30
Attending: PODIATRIST
Payer: MEDICARE

## 2017-10-30 PROCEDURE — 80048 BASIC METABOLIC PNL TOTAL CA: CPT | Performed by: INTERNAL MEDICINE

## 2017-10-30 PROCEDURE — 73718 MRI LOWER EXTREMITY W/O DYE: CPT | Performed by: PODIATRIST

## 2017-10-30 PROCEDURE — 85025 COMPLETE CBC W/AUTO DIFF WBC: CPT | Performed by: INTERNAL MEDICINE

## 2017-10-30 PROCEDURE — 86140 C-REACTIVE PROTEIN: CPT | Performed by: INTERNAL MEDICINE

## 2017-10-30 PROCEDURE — 82962 GLUCOSE BLOOD TEST: CPT

## 2017-10-30 PROCEDURE — 85652 RBC SED RATE AUTOMATED: CPT | Performed by: INTERNAL MEDICINE

## 2017-10-30 RX ORDER — AMLODIPINE BESYLATE 5 MG/1
5 TABLET ORAL DAILY
Status: DISCONTINUED | OUTPATIENT
Start: 2017-10-30 | End: 2017-10-31

## 2017-10-30 RX ORDER — HYDRALAZINE HYDROCHLORIDE 25 MG/1
25 TABLET, FILM COATED ORAL EVERY 8 HOURS PRN
Status: DISCONTINUED | OUTPATIENT
Start: 2017-10-30 | End: 2017-11-01

## 2017-10-30 NOTE — PROGRESS NOTES
[facility] Hospitalist Progress Note     [facility] Test Patient Status:  Emergency    10/10/1979 MRN [id number]   Location [address] Lyla Castillo Attending No att. providers found   [facility] Day # 27 PCP No primary p years-old Male with  Cellulitis of left lower limb. COMPARISON: None. TECHNIQUE: XR FOOT, COMPLETE (MIN 3 VIEWS), LEFT (CPT=73630) FINDINGS: ALIGNMENT:No malalignment or dislocation seen.  JOINT SPACES:Mild first MTP and midfoot joint space narrowing and ar 30 g Oral Q15 Min PRN   Or      Glucose-Vitamin C (DEX-4) 4-0.006 g chewable tab 8 tablet 8 tablet Oral Q15 Min PRN   Insulin Aspart Pen (NOVOLOG) 100 UNIT/ML flexpen 1-5 Units 1-5 Units Subcutaneous TID CC and HS   Piperacillin Sod-Tazobactam So (ZOSYN) 3

## 2017-10-30 NOTE — CONSULTS
INFECTIOUS DISEASE CONSULT NOTE    Oscar Huntley Patient Status:  Inpatient    1935 MRN [id number]   [facility] 5NW-A Attending Amada Lopez MD   Hosp Day # 0 PCP Nadya Forrester smokeless tobacco. He reports that he does not drink alcohol or use drugs. Allergies:     Ace Inhibitors            Cardura [Cardura]       Dizziness  Flomax [Tamsulosin]     Dizziness  Statins                     Medications:    Current Facility-Adminis negatives in the the HPI. Constitutional: Negative for anorexia, malaise, chills, fevers. Eyes: Negative for eye drainage and redness.   Ears, nose, mouth, throat: Negative for nasal congestion, sore throat, oral ulcers  Respiratory: Negative for cough, 4. 00   WBC  7.39  6.8   PLT  301  316.0     Recent Labs   Lab  10/29/17   1412  10/30/17   0724   GLU  171*  84   BUN  32*  26*   CREATSERUM  1.72*  1.58*   CA  8.4  8.1*   NA  139  143   K  4.40  4.2   CL  108  115*   CO2  23.1  23.0       Microbiology possible abnormal signal within the distal aspect of the second metatarsal head which may represent osteomyelitis. This is in the region of the soft tissue wound. Edema within the subcutaneous changes fat. Osteoarthritic changes.  No focal fluid collection

## 2017-10-30 NOTE — PLAN OF CARE
Diabetes/Glucose Control    • Glucose maintained within prescribed range Progressing        Patient/Family Goals    • Patient/Family Long Term Goal Progressing    • Patient/Family Short Term Goal Progressing            Problem:  Left foot cellulitis    Jorge

## 2017-10-30 NOTE — CM/SW NOTE
10/30/17 1400   CM/SW Referral Data   Referral Source Physician;Social Work (self-referral)   Reason for Referral Discharge planning   Informant Patient   Pertinent Medical Hx   Primary Care Physician Name Gary [address]   Patient Info   Patient's Mental Sta

## 2017-10-30 NOTE — PROGRESS NOTES
[facility] dosing service    Initial Pharmacokinetic Consult for Vancomycin Dosing     Mirian Mohr is a [de-identified]year old male admitted on 10/29 who is being treated for cellulitis. Pharmacy has been asked to dose Vancomycin by Dr. Laila Jordan.     He is aller 4th dose. Goal trough level 10-15 ug/mL. 3.  Pharmacy will need BUN/Scr daily while on Vancomycin to assess renal function. 4.  Pharmacy will follow and monitor renal function changes, toxicity and efficacy. Pharmacy will continue to follow him.

## 2017-10-30 NOTE — PHYSICAL THERAPY NOTE
Order received, chart reviewed. MRI L foot showed possible osteomyelitis. Will require weightbearing order for PT evaluation. Notified RN.

## 2017-10-30 NOTE — H&P
[facility] Hospitalist H&P       CC: No chief complaint on file. PCP: Amy Cotton MD    History of Present Illness:  Mr. Tab Oscar is an [de-identified] yo male with PMH of Dm, HTN, CAD s/p PCI in 2017 who presented as admit from urgent care.   Patient developed sy TAKE ONE TABLET BY MOUTH ONCE DAILY Disp: 90 tablet Rfl: 3   insulin detemir (LEVEMIR FLEXTOUCH) 100 UNIT/ML Subcutaneous Solution Pen-injector Take 15 units every PM (Patient taking differently: Take 12 units every PM ) Disp: 15 mL Rfl: 4   Insulin Pen Ne kg)  09/27/17 : 194 lb (88 kg)  08/22/17 : 199 lb (90.3 kg)    Gen: No acute distress, alert and oriented   Pulm: Lungs clear bilaterally, good inspiratory effort   CV:  nL S1/S2, RRR  Abd: soft, NT/ND, no hepatomegaly, +BS  MSK: moving all extremities, no osteomyelitis nuclear medicine bone scan or MR could be utilized for further evaluation. 3. NO radiographic evidence of acute fracture or malalignment. Arthritic changes as above.            ASSESSMENT / PLAN:       # RLE cellulitis  - IV vanc/zosyn ordered

## 2017-10-31 PROCEDURE — 87205 SMEAR GRAM STAIN: CPT | Performed by: INTERNAL MEDICINE

## 2017-10-31 PROCEDURE — 76937 US GUIDE VASCULAR ACCESS: CPT

## 2017-10-31 PROCEDURE — 87070 CULTURE OTHR SPECIMN AEROBIC: CPT | Performed by: INTERNAL MEDICINE

## 2017-10-31 PROCEDURE — 87184 SC STD DISK METHOD PER PLATE: CPT | Performed by: INTERNAL MEDICINE

## 2017-10-31 PROCEDURE — 87147 CULTURE TYPE IMMUNOLOGIC: CPT | Performed by: INTERNAL MEDICINE

## 2017-10-31 PROCEDURE — 82962 GLUCOSE BLOOD TEST: CPT

## 2017-10-31 PROCEDURE — 80048 BASIC METABOLIC PNL TOTAL CA: CPT | Performed by: INTERNAL MEDICINE

## 2017-10-31 PROCEDURE — 36569 INSJ PICC 5 YR+ W/O IMAGING: CPT

## 2017-10-31 PROCEDURE — 97116 GAIT TRAINING THERAPY: CPT

## 2017-10-31 PROCEDURE — 85025 COMPLETE CBC W/AUTO DIFF WBC: CPT | Performed by: INTERNAL MEDICINE

## 2017-10-31 PROCEDURE — 97161 PT EVAL LOW COMPLEX 20 MIN: CPT

## 2017-10-31 PROCEDURE — 02HV33Z INSERTION OF INFUSION DEVICE INTO SUPERIOR VENA CAVA, PERCUTANEOUS APPROACH: ICD-10-PCS | Performed by: INTERNAL MEDICINE

## 2017-10-31 RX ORDER — AMLODIPINE BESYLATE 5 MG/1
10 TABLET ORAL DAILY
Status: DISCONTINUED | OUTPATIENT
Start: 2017-11-01 | End: 2017-11-01

## 2017-10-31 RX ORDER — AMLODIPINE BESYLATE 5 MG/1
5 TABLET ORAL ONCE
Status: COMPLETED | OUTPATIENT
Start: 2017-10-31 | End: 2017-10-31

## 2017-10-31 NOTE — PHYSICAL THERAPY NOTE
PHYSICAL THERAPY QUICK EVALUATION - INPATIENT    Room Number: 521/521-A  Evaluation Date: 10/31/2017  Presenting Problem: Cellulitis  Physician Order: PT Eval and Treat    Problem List  Active Problems:    Cellulitis      Past Medical History  Past Medic FORM - BASIC MOBILITY  How much difficulty does the patient currently have. ..  -   Turning over in bed (including adjusting bedclothes, sheets and blankets)?: None   -   Sitting down on and standing up from a chair with arms (e.g., wheelchair, bedside comm patient questions and concerns addressed    ASSESSMENT   Patient is a [de-identified]year old male admitted on 10/29/2017 for cellulitis. Pertinent comorbidities and personal factors impacting therapy include DMII and neuropathy.   Functional outcome measures complete

## 2017-10-31 NOTE — PLAN OF CARE
Pt slept on and off through the night. Up with a standby to the bathroom. No complaints. B/p elevated, hydralazine prn. Continue to monitor.

## 2017-10-31 NOTE — PROGRESS NOTES
[facility] Hospitalist Progress Note     [facility] Test Patient Status:  Emergency    10/10/1979 MRN [id number]   Location [address] Lyla Castillo Attending No att. providers found   [facility] Day # 27 PCP No primary p Male with  Cellulitis of left lower limb. COMPARISON: None. TECHNIQUE: XR FOOT, COMPLETE (MIN 3 VIEWS), LEFT (CPT=73630) FINDINGS: ALIGNMENT:No malalignment or dislocation seen.  JOINT SPACES:Mild first MTP and midfoot joint space narrowing and arthritic ch tablet Oral Q15 Min PRN   Or      dextrose 50% injection 50 mL 50 mL Intravenous Q15 Min PRN   Or      glucose (DEX4) oral liquid 30 g 30 g Oral Q15 Min PRN   Or      Glucose-Vitamin C (DEX-4) 4-0.006 g chewable tab 8 tablet 8 tablet Oral Q15 Min PRN   Ins

## 2017-10-31 NOTE — CM/SW NOTE
Referral sent to Margarita Peaamarilys can not be served by [facility] due to insurance coverage.

## 2017-10-31 NOTE — CM/SW NOTE
MSW spoke to patient about possible need for home IVABEX. Patient would like to do it at home. MSW with patient permission spoke to patient's Dtr Marylou Simba- [phone] who agrees that her father will be fine to do it at home.     MSW explained there maybe out

## 2017-10-31 NOTE — HOME CARE LIAISON
MET WITH PTNT TO DISCUSS HOME HEALTH SERVICES AND COVERAGE CRITERIA. PTNT AGREEABLE TO Mario Faria. PTNT GIVEN RESIDENTIAL BROCHURE. RESIDENTIAL WITH PROVIDE SN/PT ON DISCHARGE.   PTNT STATSE HE  WILL LEARN TO GIVE HIS IVAB AND DTR WILL ALSO HEL

## 2017-10-31 NOTE — PROGRESS NOTES
[address] [place] [address]  TEL: [phone]  FAX: [phone]    Duane Mitchell Patient Status:  Inpatient    1935 MRN [id number]   [facility] 5NW-A Attending Geri Mejias, [facility] Day # 1 PCP María Iglesias Cellulitis of left lower limb. COMPARISON: None. TECHNIQUE: XR FOOT, COMPLETE (MIN 3 VIEWS), LEFT (CPT=73780) FINDINGS: ALIGNMENT:No malalignment or dislocation seen. JOINT SPACES:Mild first MTP and midfoot joint space narrowing and arthritic changes.  Mild abnormal signal is also identified within the second metatarsal head. #2. Soft tissue wound adjacent to the second digit #3. Edema within the subcutaneous fat.  #4. This critical value result was called to patient's nurse Alexus on 10/30/17 at 8:16 AM.  Resu

## 2017-10-31 NOTE — CONSULTS
History of Present Illness:  Mr. Magda Maloney is an [de-identified] yo male with PMH of Dm, HTN, CAD who presented as admit from urgent care. Patient developed symptoms of cellulitis and had been on PO Keflex.   Initially symptoms improving, however patient then devloped wo at lunch time Disp: 90 tablet Rfl: 3   Levothyroxine Sodium 88 MCG Oral Tab TAKE ONE TABLET BY MOUTH ONCE DAILY Disp: 90 tablet Rfl: 3   insulin detemir (LEVEMIR FLEXTOUCH) 100 UNIT/ML Subcutaneous Solution Pen-injector Take 15 units every PM (Patient erin active purulence. No fluctuance, no suggestion for remaining abscess. There is some interdigital maceration left, no keratoses. Vascular: Dorsalis pedis difficult to appreciate and posterior tibial pulses are strong, 2/4 bilaterally.  Capillary filling

## 2017-10-31 NOTE — PROGRESS NOTES
Multidisciplinary Discharge Rounds held 10/31/2017. Treatment team members present today include , , Charge Nurse,  Nurse, RT, PT and Pharmacy caring for [employer].      Other care providers present:    Patient Active Proble

## 2017-11-01 VITALS
HEART RATE: 55 BPM | OXYGEN SATURATION: 100 % | SYSTOLIC BLOOD PRESSURE: 159 MMHG | TEMPERATURE: 98 F | RESPIRATION RATE: 16 BRPM | BODY MASS INDEX: 25 KG/M2 | DIASTOLIC BLOOD PRESSURE: 76 MMHG | WEIGHT: 196 LBS

## 2017-11-01 PROCEDURE — 82962 GLUCOSE BLOOD TEST: CPT

## 2017-11-01 PROCEDURE — 82565 ASSAY OF CREATININE: CPT | Performed by: INTERNAL MEDICINE

## 2017-11-01 PROCEDURE — 87493 C DIFF AMPLIFIED PROBE: CPT | Performed by: INTERNAL MEDICINE

## 2017-11-01 RX ORDER — HYDRALAZINE HYDROCHLORIDE 25 MG/1
25 TABLET, FILM COATED ORAL EVERY 8 HOURS SCHEDULED
Qty: 90 TABLET | Refills: 0 | Status: SHIPPED | OUTPATIENT
Start: 2017-11-01 | End: 2017-12-11

## 2017-11-01 RX ORDER — HYDRALAZINE HYDROCHLORIDE 25 MG/1
25 TABLET, FILM COATED ORAL EVERY 8 HOURS SCHEDULED
Status: DISCONTINUED | OUTPATIENT
Start: 2017-11-01 | End: 2017-11-01

## 2017-11-01 RX ORDER — AMLODIPINE BESYLATE 10 MG/1
10 TABLET ORAL DAILY
Qty: 30 TABLET | Refills: 0 | Status: SHIPPED | OUTPATIENT
Start: 2017-11-02 | End: 2017-12-11

## 2017-11-01 NOTE — PROGRESS NOTES
[facility] Hospitalist Progress Note     [facility] Test Patient Status:  Emergency    10/10/1979 MRN [id number]   Location [address] Lyla Castillo Attending No att. providers found   [facility] Day # 27 PCP No primary p PGLU  234*  285*  270*  99  222*       Imaging:  MRI PENDING    Xr Foot, Complete (min 3 Views), Left (cpt=73630)    Result Date: 10/29/2017  CLINICAL INDICATION:[de-identified]years-old Male with  Cellulitis of left lower limb. COMPARISON: None.  TECHNIQUE: XR FOOT, Heparin Sodium (Porcine) 5000 UNIT/ML injection 5,000 Units 5,000 Units Subcutaneous Q8H [address]   glucose (DEX4) oral liquid 15 g 15 g Oral Q15 Min PRN   Or      Glucose-Vitamin C (DEX-4) 4-0.006 g chewable tab 4 tablet 4 tablet Oral Q15 Min PRN   Or      d

## 2017-11-01 NOTE — DISCHARGE SUMMARY
General Medicine Discharge Summary     Patient ID:  Ulices Sullivan  [de-identified]year old  12/18/1935    Admit date: 10/29/2017    Discharge date and time: 11/1/2017  4:57 PM     Attending Physician: Dimitrios Caba of BP meds     # DM  - Insulin detemir increased to 10 units qhs  - SSC     # CAD s/p recent PCI  - Continue ASA and plavix  - beta blocker as above  - Not on ACE I due to allergy      Consults: IP CONSULT TO PHARMACY  IP CONSULT TO PODIATRY  IP CONSULT TO Normal, Disp-50 each, R-6    aspirin 81 MG Oral Tab EC  Take 1 tablet (81 mg total) by mouth daily. , Historical, Disp-90 tablet, R-3    Clopidogrel Bisulfate 75 MG Oral Tab  Take 1 tablet (75 mg total) by mouth daily. , Normal, Disp-90 tablet, R-3    Choles

## 2017-11-01 NOTE — PROGRESS NOTES
[address] [place] [address]  TEL: [phone]  FAX: [phone]    Gaby Gusman Patient Status:  Inpatient    1935 MRN [id number]   [facility] 5NW-A Attending Moriah Bhardwaj, [address] [facility] Day # 2 PCP [facility] dislocation seen. JOINT SPACES:Mild first MTP and midfoot joint space narrowing and arthritic changes. Mild flattening of the second metatarsal head. OSSEOUS MINERALIZATION: Moderate diffuse osteopenia. OSSEOUS EROSIONS:None.  NO radiographic evidence of os subcutaneous fat.  #4. This critical value result was called to patient's nurse Yoli Werner on 10/30/17 at 8:16 AM.  Results read back was performed  Dictated by: Colby Aase, MD on 10/30/2017 at 8:09     Approved by: Colby Aase, MD             ASSESSMEN

## 2017-11-01 NOTE — PROGRESS NOTES
Pt was discharged home with [facility], picc flushed and new green cap applied.  All changes in meds were discussed, f/u appts were discussed, spoke to Dr Kb Scott who wants pt to keep same dressings to left foot that he had in hospital, his daughter was in

## 2017-11-01 NOTE — PLAN OF CARE
Impaired Functional Mobility    • Achieve highest/safest level of mobility/gait Progressing        Patient/Family Goals    • Patient/Family Short Term Goal Progressing        Pt A/O x 3, no distress noted. Pt has no complaints of pain.  Dressing to left nae

## 2017-11-01 NOTE — PLAN OF CARE
Diabetes/Glucose Control    • Glucose maintained within prescribed range Progressing        Impaired Functional Mobility    • Achieve highest/safest level of mobility/gait Progressing        Pt is alert and oriented, pt had I &D of abcess on right foot, Pt

## 2017-11-01 NOTE — CM/SW NOTE
Plan for discharge today. [facility] will supply [facility] and [facility] will supply [facility]. CM updated RN and patient on d/c today and start of [facility] and ABX tomorrow. Verbalize understanding. Daughter to transport patient home.

## 2017-11-02 NOTE — CM/SW NOTE
Patient discharged on 11/1/17 as previously planned.        11/02/17 0800   Discharge disposition   Discharged to: Home-Health   Name of Jeanette Proc. Ingram Rolan 1 services after discharge Skilled home care  (home infusion ([place]))   Dis

## 2017-11-06 PROBLEM — L03.116 CELLULITIS OF LEFT FOOT: Status: ACTIVE | Noted: 2017-11-06

## 2017-11-07 ENCOUNTER — LAB REQUISITION (OUTPATIENT)
Dept: LAB | Age: 82
End: 2017-11-07
Attending: INTERNAL MEDICINE
Payer: MEDICARE

## 2017-11-07 DIAGNOSIS — L03.116 CELLULITIS OF LEFT LOWER EXTREMITY: ICD-10-CM

## 2017-11-07 PROCEDURE — 85025 COMPLETE CBC W/AUTO DIFF WBC: CPT | Performed by: INTERNAL MEDICINE

## 2017-11-07 PROCEDURE — 80048 BASIC METABOLIC PNL TOTAL CA: CPT | Performed by: INTERNAL MEDICINE

## 2017-11-07 PROCEDURE — 85652 RBC SED RATE AUTOMATED: CPT | Performed by: INTERNAL MEDICINE

## 2017-11-07 PROCEDURE — 86140 C-REACTIVE PROTEIN: CPT | Performed by: INTERNAL MEDICINE

## 2017-11-08 ENCOUNTER — OFFICE VISIT (OUTPATIENT)
Dept: WOUND CARE | Age: 82
End: 2017-11-08
Attending: NURSE PRACTITIONER
Payer: MEDICARE

## 2017-11-08 DIAGNOSIS — L97.509 DM FOOT ULCER (HCC): Primary | ICD-10-CM

## 2017-11-08 DIAGNOSIS — E11.621 DM FOOT ULCER (HCC): Primary | ICD-10-CM

## 2017-11-08 DIAGNOSIS — M86.072 ACUTE HEMATOGENOUS OSTEOMYELITIS, LEFT ANKLE AND FOOT (HCC): ICD-10-CM

## 2017-11-08 PROCEDURE — 97597 DBRDMT OPN WND 1ST 20 CM/<: CPT

## 2017-11-08 PROCEDURE — 29581 APPL MULTLAYER CMPRN SYS LEG: CPT

## 2017-11-08 PROCEDURE — 99214 OFFICE O/P EST MOD 30 MIN: CPT

## 2017-11-08 NOTE — PROGRESS NOTES
Chief Complaint  This information was obtained from the patient  Patient is here for an initial consult. He presents with a wound on his left dorsal foot. He had cellulitis in October and shortly after developed a small wound on the foot.  This was found to Cancer - No History, Diabetes - Mother, Heart Disease - Mother, Hypertension - No History, Kidney Disease - No History, Lung Disease - No History, Mental Illness - No History, Stroke - No History, Thyroid Problems - No History, Tuberculosis - No History, S Psychiatric: Claustrophobia, Nervousness / Tension, Memory Loss, Depression    Additional Information  Medication reconciliation completed at today's visit. : Yes  History of chemotherapy?: No  History of radiation?: No    Medications  glimepiride 2 mg tab Heart rhythm and rate regular, without murmur or gallop. patient with strong pulsatile signals at the dp/pt/distal hallux bilaterally and dp/pt palpable bilaterally. Extremities free of varicosities, + hyperpigmentation and dry skin R>L.  Capillary refill < judgement and insight is intact, however assistance by family for medical decision-making. Alert and oriented times 3. No evidence of depression, anxiety, or agitation. Calm, cooperative, and communicative. Appropriate interactions and affect.         Asses Elevate leg(s)as much as possible.     Off-Loading  Other: - hard soled shoe that does not rub over the wound (ie surgical shoe)    Follow-Up Appointments  Other: - 1) RN visit in [place] for dressing change and edema management  2) TCOM and HBO tech con 5) I discussed his elevated inflammatory markers and the importance of continuing to adddress the osteo and keep the wound bed and visable tendon's moist and not allowing the \"hole\" in the skin to close before the woundgranulates in.       6) discussed hb

## 2017-11-10 ENCOUNTER — OFFICE VISIT (OUTPATIENT)
Dept: WOUND CARE | Age: 82
End: 2017-11-10
Attending: NURSE PRACTITIONER
Payer: MEDICARE

## 2017-11-10 DIAGNOSIS — L97.509 DM FOOT ULCER (HCC): ICD-10-CM

## 2017-11-10 DIAGNOSIS — M86.072 ACUTE HEMATOGENOUS OSTEOMYELITIS, LEFT ANKLE AND FOOT (HCC): Primary | ICD-10-CM

## 2017-11-10 DIAGNOSIS — E11.621 DM FOOT ULCER (HCC): ICD-10-CM

## 2017-11-10 PROCEDURE — 29581 APPL MULTLAYER CMPRN SYS LEG: CPT

## 2017-11-13 ENCOUNTER — LAB REQUISITION (OUTPATIENT)
Dept: LAB | Age: 82
End: 2017-11-13
Attending: INTERNAL MEDICINE
Payer: MEDICARE

## 2017-11-13 DIAGNOSIS — L03.116 CELLULITIS OF LEFT LOWER EXTREMITY: ICD-10-CM

## 2017-11-13 PROCEDURE — 86140 C-REACTIVE PROTEIN: CPT | Performed by: INTERNAL MEDICINE

## 2017-11-13 PROCEDURE — 85025 COMPLETE CBC W/AUTO DIFF WBC: CPT | Performed by: INTERNAL MEDICINE

## 2017-11-13 PROCEDURE — 80048 BASIC METABOLIC PNL TOTAL CA: CPT | Performed by: INTERNAL MEDICINE

## 2017-11-13 PROCEDURE — 85652 RBC SED RATE AUTOMATED: CPT | Performed by: INTERNAL MEDICINE

## 2017-11-14 ENCOUNTER — OFFICE VISIT (OUTPATIENT)
Dept: WOUND CARE | Facility: HOSPITAL | Age: 82
End: 2017-11-14
Attending: NURSE PRACTITIONER
Payer: MEDICARE

## 2017-11-14 DIAGNOSIS — M86.072 ACUTE HEMATOGENOUS OSTEOMYELITIS, LEFT ANKLE AND FOOT (HCC): Primary | ICD-10-CM

## 2017-11-14 PROCEDURE — 99212 OFFICE O/P EST SF 10 MIN: CPT

## 2017-11-17 ENCOUNTER — EKG ENCOUNTER (OUTPATIENT)
Dept: LAB | Facility: HOSPITAL | Age: 82
End: 2017-11-17
Attending: INTERNAL MEDICINE
Payer: MEDICARE

## 2017-11-17 ENCOUNTER — OFFICE VISIT (OUTPATIENT)
Dept: WOUND CARE | Facility: HOSPITAL | Age: 82
End: 2017-11-17
Attending: NURSE PRACTITIONER
Payer: MEDICARE

## 2017-11-17 DIAGNOSIS — I25.10 CORONARY ARTERY DISEASE INVOLVING NATIVE HEART WITHOUT ANGINA PECTORIS, UNSPECIFIED VESSEL OR LESION TYPE: ICD-10-CM

## 2017-11-17 DIAGNOSIS — I25.10 CORONARY ARTERY DISEASE: Primary | ICD-10-CM

## 2017-11-17 DIAGNOSIS — Z98.61 CORONARY ANGIOPLASTY STATUS: Primary | ICD-10-CM

## 2017-11-17 DIAGNOSIS — R94.31 ABNORMAL ELECTROCARDIOGRAM: ICD-10-CM

## 2017-11-17 PROCEDURE — 93005 ELECTROCARDIOGRAM TRACING: CPT

## 2017-11-17 PROCEDURE — 29581 APPL MULTLAYER CMPRN SYS LEG: CPT

## 2017-11-17 PROCEDURE — 93010 ELECTROCARDIOGRAM REPORT: CPT | Performed by: INTERNAL MEDICINE

## 2017-11-17 PROCEDURE — 93922 UPR/L XTREMITY ART 2 LEVELS: CPT

## 2017-11-17 NOTE — PROGRESS NOTES
Chief Complaint  This information was obtained from the patient  Patient presents today for HBO Consult. Pt denies concerns and pain at this time.      Allergies  ACE Inhibitors, Cardura (Reaction: dizziness), Flomax (Reaction: dizziness), Statins    HPI History  This information was obtained from the patient, chart  Former smoker - quit 1940's, Marital Status - , Alcohol Use - none, Tobacco Use - none, Lives in - home, Lives with - alone    Past Medical History  This information was obtained from t Physical Exam Notes   AAOX3 GERALDO EOMI - NO PALLOR  EARS: TM AND EAC NL B/L - NO CERUMEN ; NO JVD OR LAD ; THYROID NL; HEART RATE NL S1 S2 NORMAL - LUNGS CLEAR AE GOOD ALL AREAS - ABDOMEN SOFT BS + NT ND - NEURO NONFOCAL EXAM - ; EXTR - LEFT FOOT WOUND Color: Pigmented  Hair Growth on Extremity: No  Temperature of Extremity: Warm  Capillary Refill: > 3 seconds  Erythema: No  Dependent Rubor: No  Hyperpigmentation: Yes  Lipodermatosclerosis: No  Right Extremity colors, hair growth, and conditions:  Extrem medications which are contracindications to HBO  Pt. reports S/S of[de-identified] No acute infections  No past history of adriamycin, bleomycin, cisplatinum, antabuse, or sulfamylon: .   Patient does not report signs and symptoms of URI, sinus problems, fever, or viral procedure was performed by Conrado Fleming MD. Marie Hay was applied with moderate 15-25 mmhg. The procedure was tolerated well.    General Notes:  calamiine unna        Plan    Wound Orders:  Wound #1 Left, Dorsal Foot     HBOT  Other:

## 2017-11-20 ENCOUNTER — LAB REQUISITION (OUTPATIENT)
Dept: LAB | Age: 82
End: 2017-11-20
Attending: INTERNAL MEDICINE
Payer: MEDICARE

## 2017-11-20 DIAGNOSIS — L03.116 CELLULITIS OF LEFT LOWER EXTREMITY: ICD-10-CM

## 2017-11-20 PROCEDURE — 85025 COMPLETE CBC W/AUTO DIFF WBC: CPT | Performed by: INTERNAL MEDICINE

## 2017-11-20 PROCEDURE — 80048 BASIC METABOLIC PNL TOTAL CA: CPT | Performed by: INTERNAL MEDICINE

## 2017-11-20 PROCEDURE — 85652 RBC SED RATE AUTOMATED: CPT | Performed by: INTERNAL MEDICINE

## 2017-11-20 PROCEDURE — 86140 C-REACTIVE PROTEIN: CPT | Performed by: INTERNAL MEDICINE

## 2017-11-22 ENCOUNTER — APPOINTMENT (OUTPATIENT)
Dept: WOUND CARE | Age: 82
End: 2017-11-22
Attending: NURSE PRACTITIONER
Payer: MEDICARE

## 2017-11-22 ENCOUNTER — HOSPITAL ENCOUNTER (OUTPATIENT)
Dept: CV DIAGNOSTICS | Facility: HOSPITAL | Age: 82
Discharge: HOME | End: 2017-11-22
Attending: INTERNAL MEDICINE
Payer: MEDICARE

## 2017-11-22 ENCOUNTER — OFFICE VISIT (OUTPATIENT)
Dept: WOUND CARE | Facility: HOSPITAL | Age: 82
End: 2017-11-22
Attending: INTERNAL MEDICINE
Payer: MEDICARE

## 2017-11-22 DIAGNOSIS — I25.10 CORONARY ARTERY DISEASE INVOLVING NATIVE HEART WITHOUT ANGINA PECTORIS, UNSPECIFIED VESSEL OR LESION TYPE: Primary | ICD-10-CM

## 2017-11-22 DIAGNOSIS — M86.072 ACUTE HEMATOGENOUS OSTEOMYELITIS, LEFT ANKLE AND FOOT (HCC): ICD-10-CM

## 2017-11-22 DIAGNOSIS — R94.31 ABNORMAL ELECTROCARDIOGRAM: ICD-10-CM

## 2017-11-22 DIAGNOSIS — Z01.818 PREOP EXAMINATION: ICD-10-CM

## 2017-11-22 DIAGNOSIS — L97.509 DM FOOT ULCER (HCC): ICD-10-CM

## 2017-11-22 DIAGNOSIS — E11.621 DM FOOT ULCER (HCC): ICD-10-CM

## 2017-11-22 PROCEDURE — 99214 OFFICE O/P EST MOD 30 MIN: CPT

## 2017-11-22 PROCEDURE — 93306 TTE W/DOPPLER COMPLETE: CPT | Performed by: INTERNAL MEDICINE

## 2017-11-27 ENCOUNTER — APPOINTMENT (OUTPATIENT)
Dept: WOUND CARE | Facility: HOSPITAL | Age: 82
End: 2017-11-27
Payer: MEDICARE

## 2017-11-27 ENCOUNTER — LAB REQUISITION (OUTPATIENT)
Dept: LAB | Age: 82
End: 2017-11-27
Attending: INTERNAL MEDICINE
Payer: MEDICARE

## 2017-11-27 DIAGNOSIS — L03.116 CELLULITIS OF LEFT LOWER EXTREMITY: ICD-10-CM

## 2017-11-27 PROCEDURE — 85025 COMPLETE CBC W/AUTO DIFF WBC: CPT | Performed by: INTERNAL MEDICINE

## 2017-11-27 PROCEDURE — 80048 BASIC METABOLIC PNL TOTAL CA: CPT | Performed by: INTERNAL MEDICINE

## 2017-11-27 PROCEDURE — 86140 C-REACTIVE PROTEIN: CPT | Performed by: INTERNAL MEDICINE

## 2017-11-27 PROCEDURE — 85652 RBC SED RATE AUTOMATED: CPT | Performed by: INTERNAL MEDICINE

## 2017-11-28 ENCOUNTER — OFFICE VISIT (OUTPATIENT)
Dept: WOUND CARE | Facility: HOSPITAL | Age: 82
End: 2017-11-28
Attending: HOSPITALIST
Payer: MEDICARE

## 2017-11-28 DIAGNOSIS — M86.072 ACUTE HEMATOGENOUS OSTEOMYELITIS, LEFT ANKLE AND FOOT (HCC): Primary | ICD-10-CM

## 2017-11-29 ENCOUNTER — APPOINTMENT (OUTPATIENT)
Dept: WOUND CARE | Age: 82
End: 2017-11-29
Attending: NURSE PRACTITIONER
Payer: MEDICARE

## 2017-11-29 ENCOUNTER — OFFICE VISIT (OUTPATIENT)
Dept: WOUND CARE | Facility: HOSPITAL | Age: 82
End: 2017-11-29
Attending: HOSPITALIST
Payer: MEDICARE

## 2017-11-29 DIAGNOSIS — M86.072 ACUTE HEMATOGENOUS OSTEOMYELITIS, LEFT ANKLE AND FOOT (HCC): Primary | ICD-10-CM

## 2017-11-29 NOTE — PROGRESS NOTES
[facility] Tech Details      Patient Name: Daysi Claudio   Patient Number: [id number]   Patient YOB: 1935      Date: 11/29/2017   Physician / Michelle Gutierrez: Massimo Murguia, [address] [facility] [address]: [address] [facility],[address]

## 2017-11-30 ENCOUNTER — OFFICE VISIT (OUTPATIENT)
Dept: WOUND CARE | Facility: HOSPITAL | Age: 82
End: 2017-11-30
Attending: HOSPITALIST
Payer: MEDICARE

## 2017-11-30 DIAGNOSIS — M86.072 ACUTE HEMATOGENOUS OSTEOMYELITIS, LEFT ANKLE AND FOOT (HCC): Primary | ICD-10-CM

## 2017-11-30 NOTE — PROGRESS NOTES
HBO Tech Details      Patient Name: Liya Phillips   Patient Number: [id number]   Patient YOB: 1935      Date: 11/30/2017   CNA/HOLLAND/CMA: Poli Whipple   Physician / Extender: Nurys Castillo [facility]: [facility]          HBO Treatment Course D

## 2017-12-01 ENCOUNTER — OFFICE VISIT (OUTPATIENT)
Dept: WOUND CARE | Facility: HOSPITAL | Age: 82
End: 2017-12-01
Attending: INTERNAL MEDICINE
Payer: MEDICARE

## 2017-12-01 DIAGNOSIS — M86.072 ACUTE HEMATOGENOUS OSTEOMYELITIS, LEFT ANKLE AND FOOT (HCC): Primary | ICD-10-CM

## 2017-12-01 PROCEDURE — 11042 DBRDMT SUBQ TIS 1ST 20SQCM/<: CPT

## 2017-12-01 NOTE — PROGRESS NOTES
Chief Complaint  This information was obtained from the patient  Patient presents today for RN visit. Pt states he had ear tubes placed for HBO since last visit.  Still taking IV antibiotics    Allergies  ACE Inhibitors, Cardura (Reaction: dizziness), Floma 12-1-17 patient returns today with daughter, I have not seen him since initial visit, he has started hbo as well as the regranex and is doing well. he stopped wearing his \"bozo\" (surgical) shoe because he thought it was healed.  we discussed this in depth Additional Information  Medication reconciliation completed at today's visit. : Yes        Objective    Constitutional  bp wnl for patient. Pulse Regular and wnl for patient. Aleks Byrnes Respirations easy and unlabored. Temperature wnl. Weight normal for height. Aleks Byrnes  Paul The periwound skin moisture is normal. The periwound skin exhibited: Edema, Erythema, Hemosiderosis, Rubor. The periwound skin did not exhibit: Brawny Induration, Crepitus, Fluctuance, Friable, Atrophie [place], Cyanosis, Ecchymosis.  The temperature of the Other: - cover with gauze and medipore tape  Change Dressing Daily and PRN    Additional Orders:    Compression Therapy:  Tubigrip - double  Avoid prolonged standing in one place. Elevate leg(s)as much as possible.     Off-Loading  Other: - hard soled shoe

## 2017-12-01 NOTE — PROGRESS NOTES
HBO Tech Details      Patient Name: Livan Beckett   Patient Number: [id number]   Patient YOB: 1935      Date: 12/1/2017   CNA/HOLLAND/CMA: Kelli aMrtinez   Physician / Extender: [address] [place] [address], [address] [place] [address]: [facility] Treatment Course

## 2017-12-04 ENCOUNTER — LAB REQUISITION (OUTPATIENT)
Dept: LAB | Age: 82
End: 2017-12-04
Attending: INTERNAL MEDICINE
Payer: MEDICARE

## 2017-12-04 ENCOUNTER — OFFICE VISIT (OUTPATIENT)
Dept: WOUND CARE | Facility: HOSPITAL | Age: 82
End: 2017-12-04
Attending: HOSPITALIST
Payer: MEDICARE

## 2017-12-04 DIAGNOSIS — L03.116 CELLULITIS OF LEFT LOWER EXTREMITY: ICD-10-CM

## 2017-12-04 DIAGNOSIS — M86.072 ACUTE HEMATOGENOUS OSTEOMYELITIS, LEFT ANKLE AND FOOT (HCC): Primary | ICD-10-CM

## 2017-12-04 PROCEDURE — 85025 COMPLETE CBC W/AUTO DIFF WBC: CPT | Performed by: INTERNAL MEDICINE

## 2017-12-04 PROCEDURE — 86140 C-REACTIVE PROTEIN: CPT | Performed by: INTERNAL MEDICINE

## 2017-12-04 PROCEDURE — 80048 BASIC METABOLIC PNL TOTAL CA: CPT | Performed by: INTERNAL MEDICINE

## 2017-12-04 PROCEDURE — 85652 RBC SED RATE AUTOMATED: CPT | Performed by: INTERNAL MEDICINE

## 2017-12-04 NOTE — PROGRESS NOTES
HBO Tech Details  Patient Name: Hernandez Millan  Patient Number: [id number]  Patient YOB: 1935  Date: 12/4/2017  Physician / Jodee Luu: Elicia Alvares, [address] : [address] [facility] Treatment Course Details  Treatment Course Number: 1  Total Treatments

## 2017-12-05 ENCOUNTER — OFFICE VISIT (OUTPATIENT)
Dept: WOUND CARE | Facility: HOSPITAL | Age: 82
End: 2017-12-05
Attending: HOSPITALIST
Payer: MEDICARE

## 2017-12-05 DIAGNOSIS — M86.072 ACUTE HEMATOGENOUS OSTEOMYELITIS, LEFT ANKLE AND FOOT (HCC): Primary | ICD-10-CM

## 2017-12-05 NOTE — PROGRESS NOTES
HBO Tech Details      Patient Name: Gregory Torrez   Patient Number: [id number]   Patient YOB: 1935      Date: 12/5/2017   CNA/HOLLAND/CMA: Oz Braswell   Physician / Extender: Nurys Castillo [facility] St: [address] [place] [address] Drive

## 2017-12-06 ENCOUNTER — OFFICE VISIT (OUTPATIENT)
Dept: WOUND CARE | Facility: HOSPITAL | Age: 82
End: 2017-12-06
Attending: INTERNAL MEDICINE
Payer: MEDICARE

## 2017-12-06 DIAGNOSIS — M86.072 ACUTE HEMATOGENOUS OSTEOMYELITIS, LEFT ANKLE AND FOOT (HCC): Primary | ICD-10-CM

## 2017-12-06 NOTE — PROGRESS NOTES
[facility] Tech Details  Patient Name: Neto Tang  Patient Number: [id number]  Patient YOB: 1935  Date: 12/6/2017  Physician / Joe Daniel: Odalis Berrios, [address] [address] [facility] Treatment Course Details  Treatment Course Number: 1  Total Treatmen

## 2017-12-07 ENCOUNTER — OFFICE VISIT (OUTPATIENT)
Dept: WOUND CARE | Facility: HOSPITAL | Age: 82
End: 2017-12-07
Attending: HOSPITALIST
Payer: MEDICARE

## 2017-12-07 ENCOUNTER — LAB REQUISITION (OUTPATIENT)
Dept: LAB | Facility: HOSPITAL | Age: 82
End: 2017-12-07
Attending: INTERNAL MEDICINE
Payer: MEDICARE

## 2017-12-07 DIAGNOSIS — L02.612 CUTANEOUS ABSCESS OF LEFT FOOT: ICD-10-CM

## 2017-12-07 DIAGNOSIS — M86.072 ACUTE HEMATOGENOUS OSTEOMYELITIS, LEFT ANKLE AND FOOT (HCC): Primary | ICD-10-CM

## 2017-12-07 PROCEDURE — 85025 COMPLETE CBC W/AUTO DIFF WBC: CPT | Performed by: INTERNAL MEDICINE

## 2017-12-07 PROCEDURE — 85652 RBC SED RATE AUTOMATED: CPT | Performed by: INTERNAL MEDICINE

## 2017-12-07 NOTE — PROGRESS NOTES
HBO Tech Details      Patient Name: Neto Tang   Patient Number: [id number]   Patient YOB: 1935      Date: 12/7/2017   Physician / Joe Danile: Tete [address]: [facility] Treatment Course Details          Treatment Cours

## 2017-12-08 ENCOUNTER — OFFICE VISIT (OUTPATIENT)
Dept: WOUND CARE | Facility: HOSPITAL | Age: 82
End: 2017-12-08
Attending: NURSE PRACTITIONER
Payer: MEDICARE

## 2017-12-08 DIAGNOSIS — M86.072 ACUTE HEMATOGENOUS OSTEOMYELITIS, LEFT ANKLE AND FOOT (HCC): Primary | ICD-10-CM

## 2017-12-08 PROCEDURE — 99214 OFFICE O/P EST MOD 30 MIN: CPT

## 2017-12-08 NOTE — PROGRESS NOTES
Chief Complaint  This information was obtained from the patient  Patient is here for a follow up for his left foot. Patient denies any pain. Patient states \"it's getting smaller. \"     Allergies  ACE Inhibitors, Cardura (Reaction: dizziness), Flomax (Reac 12-1-17 patient returns today with daughter, I have not seen him since initial visit, he has started hbo as well as the regranex and is doing well. he stopped wearing his \"bozo\" (surgical) shoe because he thought it was healed.  we discussed this in depth Musculoskeletal: Backache, Contractures, Assistive Devices  Hematologic/Lymphatic: Swollen Glands, Bleeding Tendency, Blood Transfusion, Enlarged Lymph Nodes  Allergic/Immunologic: Hives  Psychiatric: Anxiety, Claustrophobia, Insomnia, Restraints, Suicidal Wound #1 Left, Dorsal Foot is a chronic Seymour Grade 3 Diabetic Ulcer and has received a status of Not Healed. Subsequent wound encounter measurements are 0.4cm length x 0.2cm width x 1.3cm depth, with an area of 0.08 sq cm and a volume of 0.104 cubic cm. Additional Orders:    Compression Therapy:  Tubigrip - double-patient to leave 1 layer on at night, bring the second layer for after hyperbaric to be replaced to a double layer  Avoid prolonged standing in one place. Elevate leg(s)as much as possible.

## 2017-12-08 NOTE — PROGRESS NOTES
HBO Tech Details      Patient Name: Livan Beckett   Patient Number: [id number]   Patient YOB: 1935      Date: 12/8/2017   JACKIEA/HOLLAND/CMA: Heydi Cervantes   Physician / Extender: Edi Patel, [address]: [address] [place] [address]

## 2017-12-11 ENCOUNTER — LAB REQUISITION (OUTPATIENT)
Dept: LAB | Age: 82
End: 2017-12-11
Attending: INTERNAL MEDICINE
Payer: MEDICARE

## 2017-12-11 ENCOUNTER — OFFICE VISIT (OUTPATIENT)
Dept: WOUND CARE | Facility: HOSPITAL | Age: 82
End: 2017-12-11
Attending: HOSPITALIST
Payer: MEDICARE

## 2017-12-11 DIAGNOSIS — M86.072 ACUTE HEMATOGENOUS OSTEOMYELITIS, LEFT ANKLE AND FOOT (HCC): Primary | ICD-10-CM

## 2017-12-11 DIAGNOSIS — L03.116 CELLULITIS OF LEFT LOWER EXTREMITY: ICD-10-CM

## 2017-12-11 PROCEDURE — 80048 BASIC METABOLIC PNL TOTAL CA: CPT | Performed by: INTERNAL MEDICINE

## 2017-12-11 PROCEDURE — 86140 C-REACTIVE PROTEIN: CPT | Performed by: INTERNAL MEDICINE

## 2017-12-11 PROCEDURE — 85652 RBC SED RATE AUTOMATED: CPT | Performed by: INTERNAL MEDICINE

## 2017-12-11 PROCEDURE — 85025 COMPLETE CBC W/AUTO DIFF WBC: CPT | Performed by: INTERNAL MEDICINE

## 2017-12-11 NOTE — PROGRESS NOTES
HBO Tech Details      Patient Name: Krupa Jerome   Patient Number: [id number]   Patient YOB: 1935      Date: 12/11/2017   CNA/HOLLAND/CMA: Bk Campa   Physician / Extender: Mercedez Marie, [address] [address]: [address] [facility]

## 2017-12-12 ENCOUNTER — OFFICE VISIT (OUTPATIENT)
Dept: WOUND CARE | Facility: HOSPITAL | Age: 82
End: 2017-12-12
Attending: HOSPITALIST
Payer: MEDICARE

## 2017-12-12 DIAGNOSIS — M86.072 ACUTE HEMATOGENOUS OSTEOMYELITIS, LEFT ANKLE AND FOOT (HCC): Primary | ICD-10-CM

## 2017-12-12 NOTE — PROGRESS NOTES
HBO Tech Details      Patient Name: Tania England   Patient Number: [id number]   Patient YOB: 1935      Date: 12/12/2017   Physician / Sonam :  Patricio Randall [facility] 51: [address] [facility],[address]Nd Floor

## 2017-12-13 ENCOUNTER — OFFICE VISIT (OUTPATIENT)
Dept: WOUND CARE | Facility: HOSPITAL | Age: 82
End: 2017-12-13
Attending: INTERNAL MEDICINE
Payer: MEDICARE

## 2017-12-13 DIAGNOSIS — M86.072 ACUTE HEMATOGENOUS OSTEOMYELITIS, LEFT ANKLE AND FOOT (HCC): Primary | ICD-10-CM

## 2017-12-13 NOTE — PROGRESS NOTES
HBO Tech Details      Patient Name: Livan Beckett   Patient Number: [id number]   Patient YOB: 1935      Date: 12/13/2017   Physician / Rebekah Class: Edi Patel, [address] [place] [address] Street: [facility] Treatment Course Details          Treatment C

## 2017-12-14 ENCOUNTER — OFFICE VISIT (OUTPATIENT)
Dept: WOUND CARE | Facility: HOSPITAL | Age: 82
End: 2017-12-14
Attending: HOSPITALIST
Payer: MEDICARE

## 2017-12-14 DIAGNOSIS — M86.072 ACUTE HEMATOGENOUS OSTEOMYELITIS, LEFT ANKLE AND FOOT (HCC): Primary | ICD-10-CM

## 2017-12-14 NOTE — PROGRESS NOTES
HBO Tech Details      Patient Name: Cordelia Neal   Patient Number: [id number]   Patient YOB: 1935      Date: 12/14/2017   Physician / Pasha Meraz: Martina Boxer, [address] [facility] [address]: [address] [place] [address],[address]

## 2017-12-15 ENCOUNTER — OFFICE VISIT (OUTPATIENT)
Dept: WOUND CARE | Facility: HOSPITAL | Age: 82
End: 2017-12-15
Attending: NURSE PRACTITIONER
Payer: MEDICARE

## 2017-12-15 DIAGNOSIS — M86.072 ACUTE HEMATOGENOUS OSTEOMYELITIS, LEFT ANKLE AND FOOT (HCC): Primary | ICD-10-CM

## 2017-12-15 PROCEDURE — 99214 OFFICE O/P EST MOD 30 MIN: CPT

## 2017-12-15 NOTE — PROGRESS NOTES
Chief Complaint  This information was obtained from the patient  Patient is here for a follow up for his left foot.      Allergies  ACE Inhibitors, Cardura (Reaction: dizziness), Flomax (Reaction: dizziness), Statins    HPI  This information was obtained fr 12-1-17 patient returns today with daughter, I have not seen him since initial visit, he has started hbo as well as the regranex and is doing well. he stopped wearing his \"bozo\" (surgical) shoe because he thought it was healed.  we discussed this in depth Ear/Nose/Mouth/Throat: Loss of Smell, Nose Bleeds, Loss of Taste, Hoarseness, Painful or Swollen Lymph Nodes, Sore Throat, Difficult clearing ears, Ear Pain  Respiratory: Cough, Shortness of Breath, Wheezing, Coughing Blood, Painful breathing  Cardiovascul Wound #1 Left, Dorsal Foot is a chronic Seymour Grade 3 Diabetic Ulcer and has received a status of Not Healed. Subsequent wound encounter measurements are 0.1cm length x 0.1cm width x 0.1cm depth, with an area of 0.01 sq cm and a volume of 0.001 cubic cm. Off-Loading  Other: - hard soled shoe that does not rub over the wound (ie surgical shoe). YOU NEED TO WEAR THIS \"BOZO\" SHOE WHENEVER YOU ARE WALKING. DO NOT WALK WITHOUT IT    Follow-Up Appointments  Return Appointment in 1 week.     Misc/Additional Orde

## 2017-12-18 ENCOUNTER — OFFICE VISIT (OUTPATIENT)
Dept: WOUND CARE | Facility: HOSPITAL | Age: 82
End: 2017-12-18
Attending: HOSPITALIST
Payer: MEDICARE

## 2017-12-18 DIAGNOSIS — M86.072 ACUTE HEMATOGENOUS OSTEOMYELITIS, LEFT ANKLE AND FOOT (HCC): Primary | ICD-10-CM

## 2017-12-18 NOTE — PROGRESS NOTES
HBO Tech Details      Patient Name: Tania England   Patient Number: [id number]   Patient YOB: 1935      Date: 12/18/2017   CNA/HOLLAND/CMA: Juan Evangelista   Physician / Extender: Nurys Castillo [facility]: [facility]          HBO Treatment Course D

## 2017-12-19 ENCOUNTER — OFFICE VISIT (OUTPATIENT)
Dept: WOUND CARE | Facility: HOSPITAL | Age: 82
End: 2017-12-19
Attending: HOSPITALIST
Payer: MEDICARE

## 2017-12-19 NOTE — PROGRESS NOTES
HBO Tech Details      Patient Name: Naga Oakes   Patient Number: [id number]   Patient YOB: 1935      Date: 12/19/2017   CNA/HOLLAND/CMA: Laurie Cuevas   Physician / Extender: Nurys Castillo [facility] St: Steph Mead          HBO Treatment Course D

## 2017-12-20 ENCOUNTER — OFFICE VISIT (OUTPATIENT)
Dept: WOUND CARE | Facility: HOSPITAL | Age: 82
End: 2017-12-20
Attending: INTERNAL MEDICINE
Payer: MEDICARE

## 2017-12-20 DIAGNOSIS — M86.072 ACUTE HEMATOGENOUS OSTEOMYELITIS, LEFT ANKLE AND FOOT (HCC): Primary | ICD-10-CM

## 2017-12-20 NOTE — PROGRESS NOTES
HBO Tech Details      Patient Name: Dilan Dong   Patient Number: [id number]   Patient YOB: 1935      Date: 12/20/2017   Physician / Jessica Iniguez: Shannan Iverson, [address] [place] [address] Street: [facility] Treatment Course Details          Treatment C

## 2017-12-21 ENCOUNTER — OFFICE VISIT (OUTPATIENT)
Dept: WOUND CARE | Facility: HOSPITAL | Age: 82
End: 2017-12-21
Attending: HOSPITALIST
Payer: MEDICARE

## 2017-12-21 DIAGNOSIS — M86.072 ACUTE HEMATOGENOUS OSTEOMYELITIS, LEFT ANKLE AND FOOT (HCC): Primary | ICD-10-CM

## 2017-12-21 NOTE — PROGRESS NOTES
[facility] Tech Details      Patient Name: Jose A Mac   Patient Number: [id number]   Patient YOB: 1935      Date: 12/21/2017   Physician / Kaya Perez: T[address]: [address] [place] [address]

## 2017-12-22 ENCOUNTER — OFFICE VISIT (OUTPATIENT)
Dept: WOUND CARE | Facility: HOSPITAL | Age: 82
End: 2017-12-22
Attending: INTERNAL MEDICINE
Payer: MEDICARE

## 2017-12-22 DIAGNOSIS — M86.072 ACUTE HEMATOGENOUS OSTEOMYELITIS, LEFT ANKLE AND FOOT (HCC): Primary | ICD-10-CM

## 2017-12-22 PROCEDURE — 99213 OFFICE O/P EST LOW 20 MIN: CPT

## 2017-12-22 NOTE — PROGRESS NOTES
Chief Complaint  This information was obtained from the patient  Patient is here for a follow up for his left foot. He feels that the wound is healed and denies any pain.     Allergies  ACE Inhibitors, Cardura (Reaction: dizziness), Flomax (Reaction: dizzin importance of it as well as the impotance of packing the wound with packing strip so that the top does not close before the defect below the skin.     12-8-17 patient returns today with daughter, patient took off the second layer of tubi  at [facility] and cou Loss of Taste, Hoarseness, Painful or Swollen Lymph Nodes, Sore Throat, Difficult clearing ears, Ear Pain  Respiratory: Cough, Shortness of Breath, Wheezing, Coughing Blood, Painful breathing  Cardiovascular (Central/Peripheral): Chest Pain, Dyspnea on Exe and bone are exposed. Suspected Osteomyelitis: Diagnosed Positive. No tunneling has been noted. No sinus tract has been noted. No undermining has been noted. There was no drainage noted. The patient reports a wound pain of level 0/10.  The wound margin is u moisturization and continuing with compression.

## 2017-12-22 NOTE — PROGRESS NOTES
HBO Tech Details      Patient Name: Cordelia Neal   Patient Number: [id number]   Patient YOB: 1935      Date: 12/22/2017   JACKIEA/HOLLAND/CMA: Deneen Calhoun   Physician / Extender: [address] [place] [address], [address] [place] [address] Street: [address] [place] [address]

## 2017-12-26 ENCOUNTER — APPOINTMENT (OUTPATIENT)
Dept: WOUND CARE | Facility: HOSPITAL | Age: 82
End: 2017-12-26
Payer: MEDICARE

## 2017-12-27 ENCOUNTER — APPOINTMENT (OUTPATIENT)
Dept: WOUND CARE | Facility: HOSPITAL | Age: 82
End: 2017-12-27
Payer: MEDICARE

## 2017-12-28 ENCOUNTER — APPOINTMENT (OUTPATIENT)
Dept: WOUND CARE | Facility: HOSPITAL | Age: 82
End: 2017-12-28
Payer: MEDICARE

## 2017-12-29 ENCOUNTER — APPOINTMENT (OUTPATIENT)
Dept: WOUND CARE | Facility: HOSPITAL | Age: 82
End: 2017-12-29
Payer: MEDICARE

## 2018-01-02 ENCOUNTER — APPOINTMENT (OUTPATIENT)
Dept: WOUND CARE | Facility: HOSPITAL | Age: 83
End: 2018-01-02
Payer: MEDICARE

## 2018-01-03 ENCOUNTER — APPOINTMENT (OUTPATIENT)
Dept: WOUND CARE | Facility: HOSPITAL | Age: 83
End: 2018-01-03
Payer: MEDICARE

## 2018-01-04 ENCOUNTER — APPOINTMENT (OUTPATIENT)
Dept: WOUND CARE | Facility: HOSPITAL | Age: 83
End: 2018-01-04
Payer: MEDICARE

## 2018-01-05 ENCOUNTER — APPOINTMENT (OUTPATIENT)
Dept: WOUND CARE | Facility: HOSPITAL | Age: 83
End: 2018-01-05
Payer: MEDICARE

## 2018-01-08 ENCOUNTER — APPOINTMENT (OUTPATIENT)
Dept: WOUND CARE | Facility: HOSPITAL | Age: 83
End: 2018-01-08
Payer: MEDICARE

## 2018-01-09 ENCOUNTER — APPOINTMENT (OUTPATIENT)
Dept: WOUND CARE | Facility: HOSPITAL | Age: 83
End: 2018-01-09
Payer: MEDICARE

## 2018-01-10 ENCOUNTER — APPOINTMENT (OUTPATIENT)
Dept: WOUND CARE | Facility: HOSPITAL | Age: 83
End: 2018-01-10
Payer: MEDICARE

## 2018-01-11 ENCOUNTER — APPOINTMENT (OUTPATIENT)
Dept: WOUND CARE | Facility: HOSPITAL | Age: 83
End: 2018-01-11
Payer: MEDICARE

## 2018-01-12 ENCOUNTER — APPOINTMENT (OUTPATIENT)
Dept: WOUND CARE | Facility: HOSPITAL | Age: 83
End: 2018-01-12
Payer: MEDICARE

## 2018-01-15 ENCOUNTER — APPOINTMENT (OUTPATIENT)
Dept: WOUND CARE | Facility: HOSPITAL | Age: 83
End: 2018-01-15
Payer: MEDICARE

## 2018-01-16 ENCOUNTER — APPOINTMENT (OUTPATIENT)
Dept: WOUND CARE | Facility: HOSPITAL | Age: 83
End: 2018-01-16
Payer: MEDICARE

## 2018-01-17 ENCOUNTER — APPOINTMENT (OUTPATIENT)
Dept: WOUND CARE | Facility: HOSPITAL | Age: 83
End: 2018-01-17
Payer: MEDICARE

## 2018-01-18 ENCOUNTER — APPOINTMENT (OUTPATIENT)
Dept: WOUND CARE | Facility: HOSPITAL | Age: 83
End: 2018-01-18
Payer: MEDICARE

## 2018-01-19 ENCOUNTER — APPOINTMENT (OUTPATIENT)
Dept: WOUND CARE | Facility: HOSPITAL | Age: 83
End: 2018-01-19
Payer: MEDICARE

## 2020-01-15 PROBLEM — R91.1 LUNG NODULE SEEN ON IMAGING STUDY: Status: ACTIVE | Noted: 2020-01-15

## 2020-06-12 PROBLEM — N41.0 PROSTATITIS, ACUTE: Status: ACTIVE | Noted: 2020-06-12

## 2020-06-12 PROBLEM — N30.01 ACUTE CYSTITIS WITH HEMATURIA: Status: ACTIVE | Noted: 2020-06-12

## 2021-01-06 PROBLEM — S09.93XA FACIAL TRAUMA, INITIAL ENCOUNTER: Status: ACTIVE | Noted: 2021-01-06

## 2021-01-06 PROBLEM — S02.2XXA CLOSED FRACTURE OF NASAL BONE, INITIAL ENCOUNTER: Status: ACTIVE | Noted: 2021-01-06

## 2021-04-21 PROBLEM — H40.051 OCULAR HYPERTENSION OF RIGHT EYE: Status: ACTIVE | Noted: 2017-08-30

## 2021-04-21 PROBLEM — N17.9 ACUTE-ON-CHRONIC RENAL FAILURE (HCC): Status: ACTIVE | Noted: 2017-02-05

## 2021-04-21 PROBLEM — N40.1 BENIGN PROSTATIC HYPERPLASIA WITH LOWER URINARY TRACT SYMPTOMS: Status: ACTIVE | Noted: 2017-08-22

## 2021-04-21 PROBLEM — N18.9 ACUTE-ON-CHRONIC RENAL FAILURE (HCC): Status: ACTIVE | Noted: 2017-02-05

## 2021-09-30 PROBLEM — R29.898 WEAKNESS OF BOTH LOWER EXTREMITIES: Status: ACTIVE | Noted: 2021-09-30

## 2022-05-31 NOTE — LETTER
Mely Carson 182 [address] [facility] [address], [address] [facility]     PICC LINE INSERTION CONSENT     I agree to have a Peripherally Inserted Central Catheter (PICC) placed in my arm.    1. The PICC insertion procedure, care, maintenance, risks, benefits, and c goals; and potential problems that might occur during recuperation.  They also discussed reasonable alternatives to the PICC, including risks, benefits, and side effects related to the alternatives and risks related to not receiving this procedure      ____ Holding RN to OR RN

## 2022-10-06 NOTE — LETTER
Mely Carson 182 6 [address] [address]  [place], [address] [place] [address]    Consent for Operation  Date: __________________                                Time: _______________    1.  I authorize the performance upon Elease Hasten the following operation:    Cardiac procedures to be performed, including appropriate portions of my body for medical, scientific, or educational purposes, provided my identity is not revealed by the pictures or by descriptive texts accompanying them.  If the procedure has been videotaped, th ends for purposes of reinstating the Do Not Resuscitate order.     I CERTIFY THAT I HAVE READ AND UNDERSTAND THE ABOVE CONSENT TO OPERATION, THAT MY DOCTOR PROVIDED ME WITH THE ABOVE EXPLANATIONS, THAT ALL BLANKS OR STATEMENTS REQUIRING INSERTION OR COMPLET Performing Laboratory: -64 Performing Laboratory: -40

## (undated) NOTE — IP AVS SNAPSHOT
[address]            (For Outpatient Use Only) Initial Admit Date: 10/29/2017   Inpt/Obs Admit Date: Inpt: 10/30/17 / Obs: 10/29/17   Discharge Date:    Sallie Prieto:  [de-identified]   MRN: [de-identified]   CSN: [id number]        [id number] Subscriber ID:  Pt Rel to Subscriber:    Hospital Account Financial Class: Medicare    November 1, 2017

## (undated) NOTE — LETTER
November 2, 2017          Refugio King  [address]          Dear Emilie Iniugez: The following are the results of your recent tests ordered by Eagle Banuelos MD.  Please review the list of test results.   Your result is t

## (undated) NOTE — IP AVS SNAPSHOT
Patient Demographics     Address  [address] [place] [address] Phone  [phone] (Home) *Preferred*      Emergency Contact(s)     Name Relation Home Work [place] Daughter [phone]        Allergies as of 11/1/2017  Review Fabricio Harden, DO         insulin detemir 100 UNIT/ML Sopn  Commonly known as:  LEVEMIR FLEXTOUCH      Take 12 units every PM   Linh Armstrong, DO         Insulin Pen Needle 31G X 5 MM Misc  Commonly known as:  BD PEN NEEDLE MINI U/F      Use 1 daily [id number] AmLODIPine Besylate (NORVASC) tab 10 mg 11/01/17 0745 Given      [id number] CefTRIAXone Sodium (ROCEPHIN) 2 g in sodium chloride 0.9 % 100 mL IVPB-minibag/addvantage 11/01/17 1530 New Bag      [id number] Clopidogrel Bisulfate (PLAVIX) tab 75 mg 11 SpO2  100 % Filed at 11/01/2017 1055      Patient's Most Recent Weight    Flowsheet Row Most Recent Value   Patient Weight  88.9 kg (196 lb)         Lab Results Last 24 Hours      CREATININE, SERUM [[id number]] (Abnormal)  Resulted: 11/01/17 0703, Result st Mr. Yumiko Fair is an [de-identified] yo male with PMH of Dm, HTN, CAD s/p PCI in 2017 who presented as admit from urgent care. Patient developed symptoms of cellulitis and had been on PO abx.   Initially symptoms improving, however patient then devloped worsening erythema insulin detemir (LEVEMIR FLEXTOUCH) 100 UNIT/ML Subcutaneous Solution Pen-injector Take 15 units every PM (Patient taking differently: Take 12 units every PM ) Disp: 15 mL Rfl: 4   Insulin Pen Needle (BD PEN NEEDLE MINI U/F) 31G X 5 MM Does not apply Misc Gen: No acute distress, alert and oriented   Pulm: Lungs clear bilaterally, good inspiratory effort   CV:  nL S1/S2, RRR  Abd: soft, NT/ND, no hepatomegaly, +BS  MSK: moving all extremities, no edema, + erythema over left foot extending up leg, wound with scan or MR could be utilized for further evaluation. 3. NO radiographic evidence of acute fracture or malalignment. Arthritic changes as above.            ASSESSMENT / PLAN:       # RLE cellulitis  - IV vanc/zosyn ordered  - Blood cultures and ound culture pus expressed. He was advised to present for admit to the hospital.  Pt describes a h/o well controlled glucose.   He sees Dr Jesús Gardner, podiatrist, as an out patient routinely.          PMH       Past Medical History:   Diagnosis Date   • BPH     • DIABETES Insulin Pen Needle (BD PEN NEEDLE MINI U/F) 31G X 5 MM Does not apply Misc Use 1 daily Disp: 50 each Rfl: 6   aspirin 81 MG Oral Tab EC Take 1 tablet (81 mg total) by mouth daily.  Disp: 90 tablet Rfl: 3   Clopidogrel Bisulfate 75 MG Oral Tab Take 1 tablet seconds to digits 1 through 5 bilaterally. There is mild peripheral edema noted. Neurologic:  Diminished bilaterally. Musculoskeletal: Muscle strength is 5/5 supinators and pronators. Joints are congruous, equal, and pain-free.       MRI:[JA.1]  C PHYSICAL THERAPY QUICK EVALUATION - INPATIENT    Room Number: 521/521-A  Evaluation Date: 10/31/2017  Presenting Problem: Cellulitis  Physician Order: PT Eval and Treat    Problem List  Active Problems:    Cellulitis      Past Medical History  Past M AM-PAC '6-Clicks' INPATIENT SHORT FORM - BASIC MOBILITY  How much difficulty does the patient currently have. ..  -   Turning over in bed (including adjusting bedclothes, sheets and blankets)?: None   -   Sitting down on and standing up from a chair with ar Patient End of Session: Up in chair;Needs met;Call light within reach; All patient questions and concerns addressed    ASSESSMENT   Patient is a [de-identified]year old male admitted on 10/29/2017 for cellulitis.   Pertinent comorbidities and personal factors impacting DELLA.1 - Chris Jeffrey on 10/30/2017 10:01 AM                     Occupational Therapy Notes (last 72 hours) (Notes from 10/29/2017  3:39 PM through 11/1/2017  3:39 PM)     No notes of this type exist for this encounter.       Video Swallow Study Notes

## (undated) NOTE — IP AVS SNAPSHOT
[facility] [place] [address] [place] [address], [address] [place] [address] Rd ~ [phone]                Discharge Summary   2/5/2017    Maribel Olp           Admission Information        Provider Department    2/5/2017 Suri Cruz MD  3ne-TREY [facility] CHANGE how you take these medications        Instructions Authorizing Provider    Morning Afternoon Evening As Needed    Cholestyramine 4 GM/DOSE Powd   What changed:    - how much to take  - how to take this  - when to take this  - additional instructions STOP taking these medications     AmLODIPine Besylate 5 MG Tabs   Commonly known as:  NORVASC                Where to Get Your Medications      These medications were sent to [place] [address] [address] [facility] [address] [facility] [address] [place] [address] 6.3 (02/08/17)  3.92 (02/08/17)  11.7 (L) (02/08/17)  36.1 (L) (02/08/17)  92.1 (02/08/17)  29.8 (02/08/17)  32.4  (02/08/17)  232.0     (02/06/17)  8.3 (02/06/17)  3.95 (02/06/17)  11.8 (L) (02/06/17)  36.4 (L) (02/06/17)  92.2 (02/06/17)  29.9 (02/06/17) you to explore options for quitting.     - If you have concerns related to behavioral health issues or thoughts of harming yourself, contact [facility] at [phone].     - If you don’t have insurance, Bell Bautista What to report to your healthcare team:  Dizziness, nausea, chest pain, weakness, numbness           Cholesterol Lowering Medications     Cholestyramine 4 GM/DOSE Oral Powder       Use: Lower cholesterol, protect your heart   Most common side effects: Rosanne Quezada skin swelling, palpitations, dizziness           All Other Medications     latanoprost (XALATAN) 0.005 % Ophthalmic Solution    Cholecalciferol (VITAMIN D) 1000 UNITS Oral Cap